# Patient Record
Sex: FEMALE | Race: OTHER | Employment: STUDENT | URBAN - METROPOLITAN AREA
[De-identification: names, ages, dates, MRNs, and addresses within clinical notes are randomized per-mention and may not be internally consistent; named-entity substitution may affect disease eponyms.]

---
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
## 2025-01-06 ENCOUNTER — HOSPITAL ENCOUNTER (OUTPATIENT)
Dept: PERINATAL CARE | Facility: HOSPITAL | Age: 20
End: 2025-01-06
Attending: STUDENT IN AN ORGANIZED HEALTH CARE EDUCATION/TRAINING PROGRAM
Payer: MEDICAID

## 2025-01-06 ENCOUNTER — ROUTINE PRENATAL (OUTPATIENT)
Dept: OBGYN CLINIC | Facility: CLINIC | Age: 20
End: 2025-01-06

## 2025-01-06 ENCOUNTER — HOSPITAL ENCOUNTER (OUTPATIENT)
Dept: PERINATAL CARE | Facility: HOSPITAL | Age: 20
Discharge: HOME | End: 2025-01-06
Attending: OBSTETRICS & GYNECOLOGY
Payer: MEDICAID

## 2025-01-06 VITALS
DIASTOLIC BLOOD PRESSURE: 82 MMHG | HEART RATE: 89 BPM | BODY MASS INDEX: 32 KG/M2 | SYSTOLIC BLOOD PRESSURE: 117 MMHG | WEIGHT: 181 LBS

## 2025-01-06 VITALS
WEIGHT: 182 LBS | HEART RATE: 101 BPM | BODY MASS INDEX: 32 KG/M2 | DIASTOLIC BLOOD PRESSURE: 70 MMHG | SYSTOLIC BLOOD PRESSURE: 106 MMHG

## 2025-01-06 DIAGNOSIS — Z34.02: Primary | ICD-10-CM

## 2025-01-06 DIAGNOSIS — O99.212 OTHER OBESITY DUE TO EXCESS CALORIES AFFECTING PREGNANCY IN SECOND TRIMESTER (HCC): ICD-10-CM

## 2025-01-06 DIAGNOSIS — E66.09 OTHER OBESITY DUE TO EXCESS CALORIES AFFECTING PREGNANCY IN SECOND TRIMESTER (HCC): ICD-10-CM

## 2025-01-06 DIAGNOSIS — Z36.3 ENCOUNTER FOR ANTENATAL SCREENING FOR MALFORMATION USING ULTRASOUND (HCC): ICD-10-CM

## 2025-01-06 DIAGNOSIS — Z34.02: ICD-10-CM

## 2025-01-06 DIAGNOSIS — O09.892 HIGH RISK TEEN PREGNANCY IN SECOND TRIMESTER (HCC): ICD-10-CM

## 2025-01-06 DIAGNOSIS — Z36.3 ENCOUNTER FOR ANTENATAL SCREENING FOR MALFORMATION USING ULTRASOUND (HCC): Primary | ICD-10-CM

## 2025-01-06 PROCEDURE — 76811 OB US DETAILED SNGL FETUS: CPT | Performed by: OBSTETRICS & GYNECOLOGY

## 2025-01-06 NOTE — PROGRESS NOTES
Haritha Cesar is a 19 year old  at 23w5d here for robv.  Had nl lv2 us today. Plan for follow up growth/bpp at 32wk and NST at 36wks.denies uc, lof or vb. +FM. Just got back from [place].  Had some n/v on and off. Feeling a bit better. Will do gtt/cbc/fe next week. Rtc in 4wk

## 2025-01-06 NOTE — PROGRESS NOTES
Reason for Consult:   Dear Dr. Mendoza,    Thank you for requesting ultrasound evaluation and maternal fetal medicine consultation on Haritha Cesar.  As you are aware she is a 19 year old female with a Orozco pregnancy at 23w5d.  A maternal-fetal medicine consultation was requested secondary to  teen pregnancy.  Her prenatal records and labs were reviewed.    Review of History:     OB History:    OB History    Para Term  AB Living   1 0 0 0 0 0   SAB IAB Ectopic Multiple Live Births   0 0 0 0 0      # Outcome Date GA Lbr Duglas/2nd Weight Sex Type Anes PTL Lv   1 Current                      Allergies:  Allergies[1]   Current Meds:  Current Outpatient Medications   Medication Sig Dispense Refill    cholecalciferol 50 MCG ( UT) Oral Tab Take 1 tablet (2,000 Units total) by mouth daily.      metoclopramide (REGLAN) 10 MG Oral Tab Take 1 tablet (10 mg total) by mouth every 6 (six) hours as needed. 20 tablet 0    CHLORPROMAZINE 50 MG Oral Tab TAKE 1/2 TABLET(25 MG) BY MOUTH THREE TIMES DAILY 30 tablet 0    ascorbic acid 250 MG Oral Tab Take 1 tablet (250 mg total) by mouth every other day. 90 tablet 2    Ferrous Sulfate 325 (65 Fe) MG Oral Tab Take 1 tablet (325 mg total) by mouth every other day. 90 tablet 3    calcium carbonate (TUMS) 500 MG Oral Chew Tab Chew 1 tablet (500 mg total) by mouth daily. (Patient not taking: Reported on 2024) 90 tablet 0    famotidine 20 MG Oral Tab Take 1 tablet (20 mg total) by mouth 2 (two) times daily. 60 tablet 5    Scopolamine 1.5mg TD patch 1mg/3days Place 1 patch onto the skin every third day. (Patient not taking: Reported on 2024) 10 patch 0    diphenhydrAMINE HCl 50 MG Oral Tab Take 1 tablet (50 mg total) by mouth every 4 to 6 hours. 90 tablet 1    promethazine 25 MG Rectal Suppos Place 1 suppository (25 mg total) rectally every 6 (six) hours as needed for Nausea. 30 suppository 5    Aspirin 81 MG Oral Cap Take 2 capsules by mouth daily.  (Patient not taking: Reported on 11/25/2024) 180 capsule 2    Ferrous Sulfate 325 (65 Fe) MG Oral Tab Take 1 tablet (325 mg total) by mouth daily with breakfast. 90 tablet 2    Prenatal MV-Min-Fe Fum-FA-DHA (PRENATAL/FOLIC ACID+DHA) 27-0.8-200 MG Oral Cap Take 1 capsule by mouth daily. (Patient not taking: Reported on 11/25/2024) 90 capsule 2    ondansetron 4 MG Oral Tablet Dispersible Take 1 tablet (4 mg total) by mouth every 4 (four) hours as needed for Nausea. (Patient not taking: Reported on 11/25/2024) 15 tablet 0    ondansetron 4 MG Oral Tablet Dispersible Take 1 tablet (4 mg total) by mouth every 8 (eight) hours as needed for Nausea. (Patient not taking: Reported on 11/25/2024) 30 tablet 0        HISTORY:  Past Medical History:    Anxiety    Decorative tattoo      Past Surgical History:   Procedure Laterality Date    Tonsillectomy        Family History   Problem Relation Age of Onset    Diabetes Mother     Diabetes Maternal Grandmother       Social History     Socioeconomic History    Marital status: Single   Tobacco Use    Smoking status: Never     Passive exposure: Never    Smokeless tobacco: Never   Vaping Use    Vaping status: Never Used   Substance and Sexual Activity    Alcohol use: Never    Drug use: Not Currently     Types: Cannabis    Sexual activity: Yes     Partners: Male     Social Drivers of Health     Financial Resource Strain: High Risk (9/13/2024)    Financial Resource Strain     Difficulty of Paying Living Expenses: Somewhat hard     Med Affordability: Yes   Food Insecurity: No Food Insecurity (9/13/2024)    Food Insecurity     Food Insecurity: Never true   Transportation Needs: No Transportation Needs (9/13/2024)    Transportation Needs     Lack of Transportation: No   Stress: No Stress Concern Present (9/13/2024)    Stress     Feeling of Stress : No   Housing Stability: Low Risk  (9/13/2024)    Housing Stability     Housing Instability: No        NARRATIVE:   /70   Pulse 101   Wt  182 lb (82.6 kg)   LMP 2024 (Approximate)   BMI 32.24 kg/m²            Alert and Oriented.  No acute distress          Abdomen:  soft, nontender, no contractions noted.           extremities:  nontender, no edema        DISCUSSION  During her visit we discussed and reviewed the following issues:    We discussed the morbidity and mortality associated with prematurity at various gestational ages.  The signs and symptoms of  labor and preeclampsia were discussed.        Pregnant adolescents are at particular risk for nutritional deficiencies. Adolescents have increased nutritional needs related to normal pubertal changes (eg, increased height and changes in body composition). At baseline, they may have poor diet quality, with insufficient intake of micronutrients (eg, iron, folate, zinc, calcium).  Adolescents appear to be at increased risk for adverse pregnancy outcomes, such as low-birth-weight babies and infant deaths.  Teenage mothers require instrumental deliveries approximately twice as often as women aged 20 to 24 years.    ----------    OBESITY:  Obesity during pregnancy is associated with numerous maternal and  risks.  It is not clear whether obesity is a direct cause of adverse pregnancy outcome or whether the association between obesity and adverse pregnancy outcome is due to factors such as diabetes mellitus.   Data suggest that obese women should be encouraged to undertake a weight reduction program (diet, exercise, behavior modification, and possibly bariatric surgery in some cases) prior to attempting to conceive.            Subfertility in obese women is most commonly related to ovulatory dysfunction, and, in some obese women, the ovulatory dysfunction is related to polycystic ovary syndrome (PCOS). It is also important to note that even among ovulatory women, increasing obesity is associated with decreasing spontaneous pregnancy rates.  The increased risk of miscarriage in obese  women may be because such women often have PCOS or isolated insulin resistance.                 Due to its strong association with obesity in the general population, type 2 diabetes mellitus is one of the two most common medical complications of the obese . The increased risk of type 2 diabetes is primarily related to an exaggerated increase in insulin resistance in the obese state. It is reasonable to screen obese gravidas for undiagnosed pregestational diabetes in the first trimester.   Glucose intolerance associated with gestational diabetes generally resolves postpartum; however, obese women with a history of gestational diabetes have a two-fold increased prevalence of subsequent type 2 diabetes.           An association between obesity and hypertensive disorders during pregnancy has been consistently reported.  In particular, maternal weight and BMI are independent risk factors for preeclampsia.             Studies have found that the increased risk of  birth in obese gravidas is primarily associated with obesity-related medical and  complications, rather than an intrinsic predisposition to spontaneous  birth. Prevention of  birth in these patients, therefore, should be directed toward prevention or management of medical and obstetrical complications.               Both prepregnancy obesity and excessive maternal weight gain before or during pregnancy contribute to an increased probability of  delivery.  It has also been hypothesized that obesity may lead to dystocia due to increased soft tissue deposition in the maternal pelvis.    delivery in the obese  is associated with numerous perioperative concerns, including emergency delivery, prolonged incision to delivery interval, blood loss >1000 mL, longer operative times, wound infection, thromboembolism, and endometritis.            Maternal obesity appears to be associated with a small increase in the  absolute rate of some congenital anomalies, and the risk may increase with increasing maternal weight.  The risk of neural tube defects increased significantly with maternal weight.    The analysis found that overweight and obese pregnant women experienced significantly more stillbirths than normal weight women.      Increase  testing and Level 2 Ultrasound is recommended.         OB ULTRASOUND REPORT   See imaging tab for complete ultrasound report or in PACS    Single IUP in breech presentation.    Placenta is posterior, fundal.   A 3 vessel cord is noted.  Cardiac activity is present at 143 bpm   g ( 1 lb 6 oz);   MVP is 4.3 cm .       Fetal Anatomy:  Visualized with normal appearance: head, face, spine, neck, skin, chest, abdominal wall, gastrointestinal tract, kidneys, bladder, extremities.   Brain: Visualized and normal appearances: brain parenchyma, cerebral ventricles, choroid plexus, Cisterna Magna, midline falx, cerebellum, cerebellar lobes, posterior fossa, vermis, cavum septi pellucidi.  Face: eyes normal, profile normal, nose normal, lip normal, palate normal.  Heart: visualized and normal appearance: 3 vessel view, four-chamber, left outflow tract, right outflow tract, arches.      Genetic Sonogram:  Nuchal fold normal.  Pyelectasis absent.  No hyperechogenic bowel.  Echogenic intracardiac foci absent. Nasal bone present. Choroid plexus cyst absent.      Summary of Ultrasound findings:  This is a Orozco pregnancy    The fetal measurements are consistent with established EDC. No gross ultrasound evidence of structural abnormalities are seen today. No minor markers for aneuploidy are seen. The patient understands that ultrasound cannot rule out all structural and chromosomal abnormalities.       IMPRESSION:   1. IUP @  23w5d  2. Scan consistent with dates  3. No fetal structural abnormalities seen  4. Teen pregnancy  5.  Obesity BMI 33    RECOMMENDATIONS:     Early 1 hr gtt  11-20 lb  weight gain for pregnancy  Growth US & BPP at 32 weeks  Weekly NSTs at 36 weeks    Thank you for allowing me to participate in the care of your patient.  Please do not hesitate to call with any questions or concerns.     Total time spent   45  minutes this calendar day which includes preparing to see the patient including chart review, obtaining and/or reviewing additional medical history, performing a physical exam and evaluation, documenting clinical information in the electronic medical record, independently interpreting results, counseling the patient, communicating results to the patient/family/caregiver and coordinating care.    Nando Boston D.O.  Maternal Fetal Medicine     Note to patient and family:  The 21st Century Cures Act makes medical notes available to patients in the interest of transparency.  However, please be advised that this is a medical document.  It is intended as a peer to peer communication.  It is written in medical language and may contain abbreviations or verbiage that are technical and unfamiliar.  It may appear blunt or direct.  Medical documents are intended to carry relevant information, facts as evident, and the clinical opinion of the practitioner.         [1] No Known Allergies

## 2025-01-16 ENCOUNTER — TELEPHONE (OUTPATIENT)
Dept: OBGYN CLINIC | Facility: CLINIC | Age: 20
End: 2025-01-16

## 2025-02-01 ENCOUNTER — LAB ENCOUNTER (OUTPATIENT)
Dept: LAB | Age: 20
End: 2025-02-01
Attending: STUDENT IN AN ORGANIZED HEALTH CARE EDUCATION/TRAINING PROGRAM
Payer: MEDICAID

## 2025-02-01 DIAGNOSIS — Z34.02: ICD-10-CM

## 2025-02-01 LAB
BASOPHILS # BLD AUTO: 0.05 X10(3) UL (ref 0–0.2)
BASOPHILS NFR BLD AUTO: 0.4 %
DEPRECATED HBV CORE AB SER IA-ACNC: 6 NG/ML
DEPRECATED RDW RBC AUTO: 40.1 FL (ref 35.1–46.3)
EOSINOPHIL # BLD AUTO: 0.7 X10(3) UL (ref 0–0.7)
EOSINOPHIL NFR BLD AUTO: 6.1 %
ERYTHROCYTE [DISTWIDTH] IN BLOOD BY AUTOMATED COUNT: 12.2 % (ref 11–15)
GLUCOSE 1H P GLC SERPL-MCNC: 108 MG/DL
HCT VFR BLD AUTO: 33.7 %
HGB BLD-MCNC: 11.2 G/DL
IMM GRANULOCYTES # BLD AUTO: 0.03 X10(3) UL (ref 0–1)
IMM GRANULOCYTES NFR BLD: 0.3 %
LYMPHOCYTES # BLD AUTO: 2.32 X10(3) UL (ref 1.5–5)
LYMPHOCYTES NFR BLD AUTO: 20.2 %
MCH RBC QN AUTO: 30.1 PG (ref 26–34)
MCHC RBC AUTO-ENTMCNC: 33.2 G/DL (ref 31–37)
MCV RBC AUTO: 90.6 FL
MONOCYTES # BLD AUTO: 0.42 X10(3) UL (ref 0.1–1)
MONOCYTES NFR BLD AUTO: 3.7 %
NEUTROPHILS # BLD AUTO: 7.98 X10 (3) UL (ref 1.5–7.7)
NEUTROPHILS # BLD AUTO: 7.98 X10(3) UL (ref 1.5–7.7)
NEUTROPHILS NFR BLD AUTO: 69.3 %
PLATELET # BLD AUTO: 398 10(3)UL (ref 150–450)
RBC # BLD AUTO: 3.72 X10(6)UL
WBC # BLD AUTO: 11.5 X10(3) UL (ref 4–11)

## 2025-02-01 PROCEDURE — 36415 COLL VENOUS BLD VENIPUNCTURE: CPT

## 2025-02-01 PROCEDURE — 85025 COMPLETE CBC W/AUTO DIFF WBC: CPT

## 2025-02-01 PROCEDURE — 82728 ASSAY OF FERRITIN: CPT

## 2025-02-01 PROCEDURE — 82950 GLUCOSE TEST: CPT

## 2025-02-03 ENCOUNTER — ROUTINE PRENATAL (OUTPATIENT)
Dept: OBGYN CLINIC | Facility: CLINIC | Age: 20
End: 2025-02-03

## 2025-02-03 VITALS
BODY MASS INDEX: 33 KG/M2 | DIASTOLIC BLOOD PRESSURE: 76 MMHG | SYSTOLIC BLOOD PRESSURE: 121 MMHG | HEART RATE: 89 BPM | WEIGHT: 188 LBS

## 2025-02-03 DIAGNOSIS — Z34.02: Primary | ICD-10-CM

## 2025-02-03 RX ORDER — ONDANSETRON 4 MG/1
4 TABLET, FILM COATED ORAL EVERY 8 HOURS PRN
Qty: 30 TABLET | Refills: 0 | Status: SHIPPED | OUTPATIENT
Start: 2025-02-03

## 2025-02-03 NOTE — PROGRESS NOTES
Haritha Cesar is a 19 year old  at 27w5d here for robv. Still having some nausea. Desires zofran. Not taking in much fluids. Denies uc, lof or vb. +FM. Tdap today. Will make follow up appt with MFM. Rtc in 2wk.

## 2025-02-17 ENCOUNTER — ROUTINE PRENATAL (OUTPATIENT)
Dept: OBGYN CLINIC | Facility: CLINIC | Age: 20
End: 2025-02-17

## 2025-02-17 VITALS
SYSTOLIC BLOOD PRESSURE: 116 MMHG | BODY MASS INDEX: 34 KG/M2 | HEART RATE: 83 BPM | WEIGHT: 192 LBS | DIASTOLIC BLOOD PRESSURE: 72 MMHG

## 2025-02-17 DIAGNOSIS — Z34.03: Primary | ICD-10-CM

## 2025-02-17 NOTE — PROGRESS NOTES
Haritha Cesar is a 19 year old  at 29w5d here for robv. Still having some n/v. Denies uc, lof or vb. +FM. Has growth scan with MFM in 3wk. Rtc in 2wk.

## 2025-03-03 ENCOUNTER — LAB ENCOUNTER (OUTPATIENT)
Dept: LAB | Age: 20
End: 2025-03-03
Attending: STUDENT IN AN ORGANIZED HEALTH CARE EDUCATION/TRAINING PROGRAM
Payer: MEDICAID

## 2025-03-03 ENCOUNTER — ROUTINE PRENATAL (OUTPATIENT)
Dept: OBGYN CLINIC | Facility: CLINIC | Age: 20
End: 2025-03-03

## 2025-03-03 VITALS
HEART RATE: 95 BPM | DIASTOLIC BLOOD PRESSURE: 74 MMHG | BODY MASS INDEX: 35 KG/M2 | WEIGHT: 198 LBS | SYSTOLIC BLOOD PRESSURE: 111 MMHG

## 2025-03-03 DIAGNOSIS — Z34.02: ICD-10-CM

## 2025-03-03 DIAGNOSIS — Z34.03: Primary | ICD-10-CM

## 2025-03-03 LAB — T PALLIDUM AB SER QL IA: NONREACTIVE

## 2025-03-03 PROCEDURE — 87389 HIV-1 AG W/HIV-1&-2 AB AG IA: CPT

## 2025-03-03 PROCEDURE — 36415 COLL VENOUS BLD VENIPUNCTURE: CPT

## 2025-03-03 PROCEDURE — 86780 TREPONEMA PALLIDUM: CPT

## 2025-03-03 NOTE — PROGRESS NOTES
Haritha Cesar is a 19 year old  at 31w5d here for robv. Doing well. Denies uc, lof or vb. +FM. Has growth scan next week. Considering Paragard IUD pp. Rtc in 2wks.

## 2025-03-10 ENCOUNTER — HOSPITAL ENCOUNTER (OUTPATIENT)
Dept: PERINATAL CARE | Facility: HOSPITAL | Age: 20
Discharge: HOME | End: 2025-03-10
Attending: OBSTETRICS & GYNECOLOGY
Payer: MEDICAID

## 2025-03-10 ENCOUNTER — HOSPITAL ENCOUNTER (OUTPATIENT)
Dept: PERINATAL CARE | Facility: HOSPITAL | Age: 20
End: 2025-03-10
Attending: OBSTETRICS & GYNECOLOGY
Payer: MEDICAID

## 2025-03-10 VITALS
WEIGHT: 198 LBS | BODY MASS INDEX: 35 KG/M2 | HEART RATE: 111 BPM | DIASTOLIC BLOOD PRESSURE: 72 MMHG | SYSTOLIC BLOOD PRESSURE: 102 MMHG

## 2025-03-10 DIAGNOSIS — O99.213 OTHER OBESITY DUE TO EXCESS CALORIES AFFECTING PREGNANCY IN THIRD TRIMESTER (HCC): ICD-10-CM

## 2025-03-10 DIAGNOSIS — O09.893 HIGH RISK TEEN PREGNANCY IN THIRD TRIMESTER (HCC): Primary | ICD-10-CM

## 2025-03-10 DIAGNOSIS — O26.841 UTERINE SIZE-DATE DISCREPANCY IN FIRST TRIMESTER (HCC): ICD-10-CM

## 2025-03-10 DIAGNOSIS — E66.09 OTHER OBESITY DUE TO EXCESS CALORIES AFFECTING PREGNANCY IN THIRD TRIMESTER (HCC): ICD-10-CM

## 2025-03-10 PROCEDURE — 76816 OB US FOLLOW-UP PER FETUS: CPT | Performed by: OBSTETRICS & GYNECOLOGY

## 2025-03-10 NOTE — PROGRESS NOTES
Pt for Growth US  Denies pregnancy complaints  States active fetus   Information given to pt for [facility]

## 2025-03-10 NOTE — PROGRESS NOTES
Reason for Consult:   Dear Dr. Mendoza,    Thank you for requesting ultrasound evaluation and maternal fetal medicine consultation on Haritha Cesar.  As you are aware she is a 19 year old female with a Orozco pregnancy with Estimated Date of Delivery: 25 .   A maternal-fetal medicine f/u is today. .  Her prenatal records and labs were reviewed.    Planning on copper IUD  Review of History:     OB History:    OB History    Para Term  AB Living   1 0 0 0 0 0   SAB IAB Ectopic Multiple Live Births   0 0 0 0 0      # Outcome Date GA Lbr Duglas/2nd Weight Sex Type Anes PTL Lv   1 Current                      Allergies:  Allergies[1]   Current Meds:  Current Outpatient Medications   Medication Sig Dispense Refill    ondansetron (ZOFRAN) 4 mg tablet Take 1 tablet (4 mg total) by mouth every 8 (eight) hours as needed for Nausea. (Patient not taking: Reported on 2025) 30 tablet 0    cholecalciferol 50 MCG (2000 UT) Oral Tab Take 1 tablet (2,000 Units total) by mouth daily. (Patient not taking: Reported on 2025)      metoclopramide (REGLAN) 10 MG Oral Tab Take 1 tablet (10 mg total) by mouth every 6 (six) hours as needed. (Patient not taking: Reported on 2025) 20 tablet 0    CHLORPROMAZINE 50 MG Oral Tab TAKE 1/2 TABLET(25 MG) BY MOUTH THREE TIMES DAILY (Patient not taking: Reported on 2025) 30 tablet 0    ascorbic acid 250 MG Oral Tab Take 1 tablet (250 mg total) by mouth every other day. (Patient not taking: Reported on 2025) 90 tablet 2    calcium carbonate (TUMS) 500 MG Oral Chew Tab Chew 1 tablet (500 mg total) by mouth daily. (Patient not taking: Reported on 2025) 90 tablet 0    famotidine 20 MG Oral Tab Take 1 tablet (20 mg total) by mouth 2 (two) times daily. (Patient not taking: Reported on 2025) 60 tablet 5    diphenhydrAMINE HCl 50 MG Oral Tab Take 1 tablet (50 mg total) by mouth every 4 to 6 hours. (Patient not taking: Reported on 2025) 90 tablet 1     Aspirin 81 MG Oral Cap Take 2 capsules by mouth daily. (Patient not taking: Reported on 2/17/2025) 180 capsule 2    Ferrous Sulfate 325 (65 Fe) MG Oral Tab Take 1 tablet (325 mg total) by mouth daily with breakfast. 90 tablet 2    Prenatal MV-Min-Fe Fum-FA-DHA (PRENATAL/FOLIC ACID+DHA) 27-0.8-200 MG Oral Cap Take 1 capsule by mouth daily. 90 capsule 2    ondansetron 4 MG Oral Tablet Dispersible Take 1 tablet (4 mg total) by mouth every 4 (four) hours as needed for Nausea. (Patient not taking: Reported on 2/17/2025) 15 tablet 0        HISTORY:  Past Medical History:    Anxiety    Decorative tattoo      Past Surgical History:   Procedure Laterality Date    Tonsillectomy        Family History   Problem Relation Age of Onset    Diabetes Mother     Diabetes Maternal Grandmother       Social History     Socioeconomic History    Marital status: Single   Tobacco Use    Smoking status: Never     Passive exposure: Never    Smokeless tobacco: Never   Vaping Use    Vaping status: Never Used   Substance and Sexual Activity    Alcohol use: Never    Drug use: Not Currently     Types: Cannabis    Sexual activity: Yes     Partners: Male     Social Drivers of Health     Food Insecurity: No Food Insecurity (9/13/2024)    Food Insecurity     Food Insecurity: Never true   Transportation Needs: No Transportation Needs (9/13/2024)    Transportation Needs     Lack of Transportation: No   Stress: No Stress Concern Present (9/13/2024)    Stress     Feeling of Stress : No   Housing Stability: Low Risk  (9/13/2024)    Housing Stability     Housing Instability: No        NARRATIVE:   /72   Pulse 111   Wt 198 lb (89.8 kg)   LMP 07/04/2024 (Approximate)   BMI 35.07 kg/m²            Alert and Oriented.  No acute distress          Abdomen:  soft, nontender, no contractions noted.           DISCUSSION  During her visit we discussed and reviewed the following issues:    We discussed the morbidity and mortality associated with prematurity  at various gestational ages.  The signs and symptoms of  labor and preeclampsia were discussed.        Pregnant adolescents are at particular risk for nutritional deficiencies. Adolescents have increased nutritional needs related to normal pubertal changes (eg, increased height and changes in body composition). At baseline, they may have poor diet quality, with insufficient intake of micronutrients (eg, iron, folate, zinc, calcium).  Adolescents appear to be at increased risk for adverse pregnancy outcomes, such as low-birth-weight babies and infant deaths.  Teenage mothers require instrumental deliveries approximately twice as often as women aged 20 to 24 years.    ----------    OBESITY: Please see previous MFM detailed discussion.       GLUCOSE 1HR OB   Date Value Ref Range Status   2025 108 See comment mg/dL Final     Comment:     If the plasma glucose level measured after 1 hour is >=130, 135 or 140 mg/dl proceed to \"Glucose Tolerance, 100 gm (0 hr, 1 hr, 2hr, 3hr), Gestational (ADA)\" test on a separate day, as clinically indicated.            Signs and symptoms of preeclampsia were reviewed.      OB ULTRASOUND REPORT   See imaging tab for complete ultrasound report or in PACS  Ultrasound Findings:  Single IUP in cephalic presentation.    Placenta is posterior.   A 3 vessel cord is noted.  Cardiac activity is present at 151 bpm  EFW 1996 g ( 4 lb 6 oz); 43%.    BUBBA is  15.7 cm.  MVP is 5.2 cm  BPP is 8/8.     The fetal measurements are consistent with established EDC. No gross ultrasound evidence of structural abnormalities are seen today. The patient understands that ultrasound cannot rule out all structural and chromosomal abnormalities.       IMPRESSION:   1. IUP @  32w5d   2. Scan consistent with dates  3. No fetal structural abnormalities seen  4. Teen pregnancy  5.  Obesity BMI 33    RECOMMENDATIONS:     11-20 lb weight gain for pregnancy  Weekly NSTs at 36 weeks    Thank you for allowing me  to participate in the care of your patient.  Please do not hesitate to call with any questions or concerns.     Total time spent   20  minutes this calendar day which includes preparing to see the patient including chart review, obtaining and/or reviewing additional medical history, performing a physical exam and evaluation, documenting clinical information in the electronic medical record, independently interpreting results, counseling the patient, communicating results to the patient/family/caregiver and coordinating care.    Tootie Mccord MD   Maternal Fetal Medicine     Note to patient and family:  The 21st Century Cures Act makes medical notes available to patients in the interest of transparency.  However, please be advised that this is a medical document.  It is intended as a peer to peer communication.  It is written in medical language and may contain abbreviations or verbiage that are technical and unfamiliar.  It may appear blunt or direct.  Medical documents are intended to carry relevant information, facts as evident, and the clinical opinion of the practitioner.         [1] No Known Allergies

## 2025-03-17 ENCOUNTER — ROUTINE PRENATAL (OUTPATIENT)
Dept: OBGYN CLINIC | Facility: CLINIC | Age: 20
End: 2025-03-17

## 2025-03-17 VITALS
SYSTOLIC BLOOD PRESSURE: 121 MMHG | BODY MASS INDEX: 36 KG/M2 | HEART RATE: 99 BPM | WEIGHT: 201.81 LBS | DIASTOLIC BLOOD PRESSURE: 83 MMHG

## 2025-03-17 DIAGNOSIS — Z34.03: Primary | ICD-10-CM

## 2025-03-17 NOTE — PROGRESS NOTES
Haritha Cesar is a 19 year old  at 33w5d here for robv. Denies uc, lof or vb. +FM. Had normal growth scan. GBS next visit. Start NST at 36wks. Rtc in 2wk.

## 2025-03-31 ENCOUNTER — ROUTINE PRENATAL (OUTPATIENT)
Dept: OBGYN CLINIC | Facility: CLINIC | Age: 20
End: 2025-03-31

## 2025-03-31 VITALS
BODY MASS INDEX: 37 KG/M2 | DIASTOLIC BLOOD PRESSURE: 73 MMHG | SYSTOLIC BLOOD PRESSURE: 118 MMHG | HEART RATE: 107 BPM | WEIGHT: 208 LBS

## 2025-03-31 DIAGNOSIS — Z34.03: Primary | ICD-10-CM

## 2025-03-31 NOTE — PROGRESS NOTES
Haritha Cesar is a 19 year old  at 35w5d here for robv. Denies uc, lof or vb. +FM. Gbs done today. Nst to start next week. ptl/fkc precx reviewed. Rtc in one week.

## 2025-04-07 ENCOUNTER — ROUTINE PRENATAL (OUTPATIENT)
Dept: OBGYN CLINIC | Facility: CLINIC | Age: 20
End: 2025-04-07

## 2025-04-07 VITALS
DIASTOLIC BLOOD PRESSURE: 83 MMHG | SYSTOLIC BLOOD PRESSURE: 135 MMHG | WEIGHT: 210 LBS | HEART RATE: 107 BPM | BODY MASS INDEX: 37 KG/M2

## 2025-04-07 DIAGNOSIS — Z34.01: ICD-10-CM

## 2025-04-07 LAB — GROUP B STREP BY PCR FOR PCR OVT: POSITIVE

## 2025-04-07 RX ORDER — MULTIVIT-MIN/IRON/FOLIC ACID/K 18-600-40
1 CAPSULE ORAL DAILY
Qty: 90 CAPSULE | Refills: 2 | Status: SHIPPED | OUTPATIENT
Start: 2025-04-07

## 2025-04-07 NOTE — PROGRESS NOTES
Haritha Cesar is a 19 year old  at 36w5d here for robv. Doing well. Denies uc, lof or vb. +FM. NST R. No ctx on toco. Reviewed GBS+. Reviewed delivery timing, will schedule IOL at 39wk. Cont weekly NST. Labor/fkc precx reviewed.

## 2025-04-14 ENCOUNTER — TELEPHONE (OUTPATIENT)
Dept: OBGYN CLINIC | Facility: CLINIC | Age: 20
End: 2025-04-14

## 2025-04-14 ENCOUNTER — ROUTINE PRENATAL (OUTPATIENT)
Dept: OBGYN CLINIC | Facility: CLINIC | Age: 20
End: 2025-04-14

## 2025-04-14 VITALS
WEIGHT: 214 LBS | SYSTOLIC BLOOD PRESSURE: 117 MMHG | DIASTOLIC BLOOD PRESSURE: 80 MMHG | BODY MASS INDEX: 38 KG/M2 | HEART RATE: 95 BPM

## 2025-04-14 DIAGNOSIS — Z34.03: Primary | ICD-10-CM

## 2025-04-14 RX ORDER — PRENATAL VIT/IRON FUM/FOLIC AC 27MG-0.8MG
1 TABLET ORAL DAILY
COMMUNITY
Start: 2025-04-07

## 2025-04-14 NOTE — TELEPHONE ENCOUNTER
Please schedule the following surgery:    Procedure: induction of labor, cytotec PM  Assist: (Y/N or none) n  Date: 4/23  Dx:teen pregnancy  Pre-op appt: (Y/N or n/a) n  Admission type: (IN/OUT/OBVS) in  Department of discharge(SDS/Floor): fbc  Expected length of stay: 2d  Procedure length time (please enter amount you are requesting): n/a  Recovery time (patients always ask): 6wk  Medical Clearance:  yes if >49y/o or major medical problems or BMI >40   Special Requests: n    Surgical prophylaxis: n       ALL Medicaid/including BCBS community: Tubal/ Hyst form MUST be signed (30 days):     Message to nurses: n

## 2025-04-14 NOTE — PATIENT INSTRUCTIONS
Getting ready for baby!     [ ] Classes:   [email or web address]    [ ] Pre-registration for hospital:  [email or web address]    [ ] Pediatrician or Family medicine practitioner:  [email or web address]    Labor & delivery staff will notify your practitioner (if on staff at [facility]) when delivery occurs  or   [facility] pediatric hospitalist will care for your infant(s) after delivery during the hospital stay, but you   must arrange for a pediatrician follow up visit before discharge from the hospital.     [ ] Breast pump:   Options:  You may ask your insurance to send us an order form  We send order form to [employer]. They will contact your insurance company    You should NOT bring your breast pump to the hospital (to avoid loss or damaged parts)  The hospital will have a pump there already for your use while at the hospital.   If you do not have a pump at home, you can rent one from the hospital while waiting to get one    [ ] Carseat:  MUST be installed properly for your infant(s) to be discharged safely from hospital    [ ] Pack a bag for yourself (can keep in your car):   Anticipated length of stay:      Vaginal delivery (uncomplicated): 24-48 hours (1-2 days)      delivery (uncomplicated): 48-96 hours (2-4 days)     What to bring:      Soft clothing, pajamas, robe (if desired), slippers, shampoo, conditioner, toothpaste, toothbrush, floss,      facial cleanser, glasses and contacts, lip balm, cell phone & , a few cute baby outfits.     A small fan that can attach to/clip on to bed side-rail can be helpful     A few tight sports bras (if not planning to breast feed)    Hospital will provide (if needed)     Gown, socks with treads, shampoo, toothpaste, toothbrush, soap, towels, bedding, diapers,      ice pack pads & pads for mom, mesh underwear, infant hat, infant gown, swaddling blanket, formula,      breast pump &  supplies, supplies for claire-care, hemorrhoids, constipation

## 2025-04-14 NOTE — PROGRESS NOTES
Haritha Cesar is a 19 year old  at 37w5d here for robv. NST R, one ctx on toco. She denies uc, lof or vb. +FM. Some annabelle nguyen. Desires IOL at 39wks. Request sent. Cont weekly nst. GBS+ Labor/fkc precx reviewed.

## 2025-04-21 ENCOUNTER — ROUTINE PRENATAL (OUTPATIENT)
Dept: OBGYN CLINIC | Facility: CLINIC | Age: 20
End: 2025-04-21

## 2025-04-21 ENCOUNTER — TELEPHONE (OUTPATIENT)
Dept: OBGYN UNIT | Facility: HOSPITAL | Age: 20
End: 2025-04-21

## 2025-04-21 VITALS
SYSTOLIC BLOOD PRESSURE: 118 MMHG | DIASTOLIC BLOOD PRESSURE: 84 MMHG | HEART RATE: 97 BPM | BODY MASS INDEX: 38 KG/M2 | WEIGHT: 215 LBS

## 2025-04-21 DIAGNOSIS — Z34.03: Primary | ICD-10-CM

## 2025-04-21 NOTE — PROGRESS NOTES
Haritha Cesar is a 19 year old  at 38w5d here for robv. Doing well. Denies uc, lof or vb. +FM. Had IOL for Wed night. Labor/fkc precx reviewed.

## 2025-04-24 ENCOUNTER — APPOINTMENT (OUTPATIENT)
Dept: OBGYN CLINIC | Facility: HOSPITAL | Age: 20
End: 2025-04-24
Payer: MEDICAID

## 2025-04-24 ENCOUNTER — HOSPITAL ENCOUNTER (INPATIENT)
Facility: HOSPITAL | Age: 20
LOS: 4 days | Discharge: HOME | End: 2025-04-28
Attending: STUDENT IN AN ORGANIZED HEALTH CARE EDUCATION/TRAINING PROGRAM | Admitting: STUDENT IN AN ORGANIZED HEALTH CARE EDUCATION/TRAINING PROGRAM
Payer: MEDICAID

## 2025-04-24 PROBLEM — Z34.90 PREGNANT (HCC): Status: ACTIVE | Noted: 2025-04-24

## 2025-04-24 PROBLEM — Z34.90 PREGNANCY (HCC): Status: ACTIVE | Noted: 2025-04-24

## 2025-04-24 LAB
ANTIBODY SCREEN: NEGATIVE
BASOPHILS # BLD AUTO: 0.03 X10(3) UL (ref 0–0.2)
BASOPHILS NFR BLD AUTO: 0.3 %
DEPRECATED RDW RBC AUTO: 43.3 FL (ref 35.1–46.3)
EOSINOPHIL # BLD AUTO: 0.49 X10(3) UL (ref 0–0.7)
EOSINOPHIL NFR BLD AUTO: 4.8 %
ERYTHROCYTE [DISTWIDTH] IN BLOOD BY AUTOMATED COUNT: 13.9 % (ref 11–15)
HCT VFR BLD AUTO: 32.3 % (ref 35–48)
HGB BLD-MCNC: 10.7 G/DL (ref 12–16)
IMM GRANULOCYTES # BLD AUTO: 0.03 X10(3) UL (ref 0–1)
IMM GRANULOCYTES NFR BLD: 0.3 %
LYMPHOCYTES # BLD AUTO: 2.74 X10(3) UL (ref 1.5–5)
LYMPHOCYTES NFR BLD AUTO: 26.8 %
MCH RBC QN AUTO: 28.2 PG (ref 26–34)
MCHC RBC AUTO-ENTMCNC: 33.1 G/DL (ref 31–37)
MCV RBC AUTO: 85.2 FL (ref 80–100)
MONOCYTES # BLD AUTO: 0.55 X10(3) UL (ref 0.1–1)
MONOCYTES NFR BLD AUTO: 5.4 %
NEUTROPHILS # BLD AUTO: 6.4 X10 (3) UL (ref 1.5–7.7)
NEUTROPHILS # BLD AUTO: 6.4 X10(3) UL (ref 1.5–7.7)
NEUTROPHILS NFR BLD AUTO: 62.4 %
PLATELET # BLD AUTO: 385 10(3)UL (ref 150–450)
RBC # BLD AUTO: 3.79 X10(6)UL (ref 3.8–5.3)
RH BLOOD TYPE: POSITIVE
RH BLOOD TYPE: POSITIVE
T PALLIDUM AB SER QL IA: NONREACTIVE
WBC # BLD AUTO: 10.2 X10(3) UL (ref 4–11)

## 2025-04-24 PROCEDURE — 3E0DXGC INTRODUCTION OF OTHER THERAPEUTIC SUBSTANCE INTO MOUTH AND PHARYNX, EXTERNAL APPROACH: ICD-10-PCS | Performed by: OBSTETRICS & GYNECOLOGY

## 2025-04-24 RX ORDER — DEXTROSE, SODIUM CHLORIDE, SODIUM LACTATE, POTASSIUM CHLORIDE, AND CALCIUM CHLORIDE 5; .6; .31; .03; .02 G/100ML; G/100ML; G/100ML; G/100ML; G/100ML
INJECTION, SOLUTION INTRAVENOUS AS NEEDED
Status: DISCONTINUED | OUTPATIENT
Start: 2025-04-24 | End: 2025-04-26 | Stop reason: HOSPADM

## 2025-04-24 RX ORDER — TERBUTALINE SULFATE 1 MG/ML
0.25 INJECTION SUBCUTANEOUS AS NEEDED
Status: COMPLETED | OUTPATIENT
Start: 2025-04-24 | End: 2025-04-25

## 2025-04-24 RX ORDER — TERBUTALINE SULFATE 1 MG/ML
0.25 INJECTION SUBCUTANEOUS AS NEEDED
Status: DISCONTINUED | OUTPATIENT
Start: 2025-04-24 | End: 2025-04-26 | Stop reason: HOSPADM

## 2025-04-24 RX ORDER — ACETAMINOPHEN 500 MG
1000 TABLET ORAL EVERY 6 HOURS PRN
Status: DISCONTINUED | OUTPATIENT
Start: 2025-04-24 | End: 2025-04-26 | Stop reason: HOSPADM

## 2025-04-24 RX ORDER — HYDROXYZINE HYDROCHLORIDE 50 MG/ML
50 INJECTION, SOLUTION INTRAMUSCULAR EVERY 6 HOURS PRN
Status: DISCONTINUED | OUTPATIENT
Start: 2025-04-24 | End: 2025-04-26 | Stop reason: HOSPADM

## 2025-04-24 RX ORDER — SODIUM CHLORIDE, SODIUM LACTATE, POTASSIUM CHLORIDE, CALCIUM CHLORIDE 600; 310; 30; 20 MG/100ML; MG/100ML; MG/100ML; MG/100ML
INJECTION, SOLUTION INTRAVENOUS CONTINUOUS
Status: DISCONTINUED | OUTPATIENT
Start: 2025-04-24 | End: 2025-04-26 | Stop reason: HOSPADM

## 2025-04-24 RX ORDER — IBUPROFEN 600 MG/1
600 TABLET, FILM COATED ORAL ONCE AS NEEDED
Status: DISCONTINUED | OUTPATIENT
Start: 2025-04-24 | End: 2025-04-26 | Stop reason: HOSPADM

## 2025-04-24 RX ORDER — NALBUPHINE HYDROCHLORIDE 10 MG/ML
10 INJECTION INTRAMUSCULAR; INTRAVENOUS; SUBCUTANEOUS EVERY 6 HOURS PRN
Status: DISCONTINUED | OUTPATIENT
Start: 2025-04-24 | End: 2025-04-26 | Stop reason: HOSPADM

## 2025-04-24 RX ORDER — METOCLOPRAMIDE HYDROCHLORIDE 5 MG/ML
10 INJECTION INTRAMUSCULAR; INTRAVENOUS EVERY 6 HOURS PRN
Status: DISCONTINUED | OUTPATIENT
Start: 2025-04-24 | End: 2025-04-26 | Stop reason: HOSPADM

## 2025-04-24 RX ORDER — LIDOCAINE HYDROCHLORIDE 10 MG/ML
30 INJECTION, SOLUTION EPIDURAL; INFILTRATION; INTRACAUDAL; PERINEURAL ONCE
Status: DISCONTINUED | OUTPATIENT
Start: 2025-04-24 | End: 2025-04-26 | Stop reason: HOSPADM

## 2025-04-24 RX ORDER — CALCIUM CARBONATE 500 MG/1
1000 TABLET, CHEWABLE ORAL EVERY 4 HOURS PRN
Status: DISCONTINUED | OUTPATIENT
Start: 2025-04-24 | End: 2025-04-26 | Stop reason: HOSPADM

## 2025-04-24 RX ORDER — ONDANSETRON 2 MG/ML
4 INJECTION INTRAMUSCULAR; INTRAVENOUS EVERY 6 HOURS PRN
Status: DISCONTINUED | OUTPATIENT
Start: 2025-04-24 | End: 2025-04-26 | Stop reason: HOSPADM

## 2025-04-24 RX ORDER — CITRIC ACID/SODIUM CITRATE 334-500MG
30 SOLUTION, ORAL ORAL AS NEEDED
Status: DISCONTINUED | OUTPATIENT
Start: 2025-04-24 | End: 2025-04-26 | Stop reason: HOSPADM

## 2025-04-24 RX ORDER — ACETAMINOPHEN 500 MG
500 TABLET ORAL EVERY 6 HOURS PRN
Status: DISCONTINUED | OUTPATIENT
Start: 2025-04-24 | End: 2025-04-26 | Stop reason: HOSPADM

## 2025-04-25 ENCOUNTER — ANESTHESIA EVENT (OUTPATIENT)
Dept: OBGYN UNIT | Facility: HOSPITAL | Age: 20
End: 2025-04-25
Payer: MEDICAID

## 2025-04-25 ENCOUNTER — ANESTHESIA (OUTPATIENT)
Dept: OBGYN UNIT | Facility: HOSPITAL | Age: 20
End: 2025-04-25
Payer: MEDICAID

## 2025-04-25 RX ORDER — DIPHENHYDRAMINE HYDROCHLORIDE 50 MG/ML
12.5 INJECTION, SOLUTION INTRAMUSCULAR; INTRAVENOUS EVERY 4 HOURS PRN
Status: ACTIVE | OUTPATIENT
Start: 2025-04-25 | End: 2025-04-26

## 2025-04-25 RX ORDER — METOCLOPRAMIDE 10 MG/1
10 TABLET ORAL ONCE
Status: DISCONTINUED | OUTPATIENT
Start: 2025-04-25 | End: 2025-04-26 | Stop reason: HOSPADM

## 2025-04-25 RX ORDER — FAMOTIDINE 20 MG/1
20 TABLET, FILM COATED ORAL ONCE
Status: COMPLETED | OUTPATIENT
Start: 2025-04-25 | End: 2025-04-25

## 2025-04-25 RX ORDER — ONDANSETRON 2 MG/ML
4 INJECTION INTRAMUSCULAR; INTRAVENOUS ONCE AS NEEDED
Status: ACTIVE | OUTPATIENT
Start: 2025-04-25 | End: 2025-04-25

## 2025-04-25 RX ORDER — SODIUM CHLORIDE, SODIUM LACTATE, POTASSIUM CHLORIDE, CALCIUM CHLORIDE 600; 310; 30; 20 MG/100ML; MG/100ML; MG/100ML; MG/100ML
INJECTION, SOLUTION INTRAVENOUS CONTINUOUS PRN
Status: DISCONTINUED | OUTPATIENT
Start: 2025-04-25 | End: 2025-04-26 | Stop reason: SURG

## 2025-04-25 RX ORDER — LIDOCAINE HYDROCHLORIDE AND EPINEPHRINE 20; 5 MG/ML; UG/ML
INJECTION, SOLUTION EPIDURAL; INFILTRATION; INTRACAUDAL; PERINEURAL AS NEEDED
Status: DISCONTINUED | OUTPATIENT
Start: 2025-04-25 | End: 2025-04-26 | Stop reason: SURG

## 2025-04-25 RX ORDER — TRANEXAMIC ACID 10 MG/ML
INJECTION, SOLUTION INTRAVENOUS AS NEEDED
Status: DISCONTINUED | OUTPATIENT
Start: 2025-04-25 | End: 2025-04-26 | Stop reason: SURG

## 2025-04-25 RX ORDER — NALOXONE HYDROCHLORIDE 0.4 MG/ML
0.08 INJECTION, SOLUTION INTRAMUSCULAR; INTRAVENOUS; SUBCUTANEOUS
Status: ACTIVE | OUTPATIENT
Start: 2025-04-25 | End: 2025-04-26

## 2025-04-25 RX ORDER — TRANEXAMIC ACID 10 MG/ML
INJECTION, SOLUTION INTRAVENOUS
Status: DISPENSED
Start: 2025-04-25 | End: 2025-04-26

## 2025-04-25 RX ORDER — MORPHINE SULFATE 1 MG/ML
INJECTION, SOLUTION EPIDURAL; INTRATHECAL; INTRAVENOUS AS NEEDED
Status: DISCONTINUED | OUTPATIENT
Start: 2025-04-25 | End: 2025-04-26 | Stop reason: SURG

## 2025-04-25 RX ORDER — MEPERIDINE HYDROCHLORIDE 25 MG/ML
INJECTION INTRAMUSCULAR; INTRAVENOUS; SUBCUTANEOUS
Status: DISPENSED
Start: 2025-04-25 | End: 2025-04-26

## 2025-04-25 RX ORDER — HYDROMORPHONE HYDROCHLORIDE 1 MG/ML
0.6 INJECTION, SOLUTION INTRAMUSCULAR; INTRAVENOUS; SUBCUTANEOUS
Refills: 0 | Status: ACTIVE | OUTPATIENT
Start: 2025-04-25 | End: 2025-04-26

## 2025-04-25 RX ORDER — BUPIVACAINE HCL/0.9 % NACL/PF 0.25 %
5 PLASTIC BAG, INJECTION (ML) EPIDURAL AS NEEDED
Status: DISCONTINUED | OUTPATIENT
Start: 2025-04-25 | End: 2025-04-26

## 2025-04-25 RX ORDER — METHYLERGONOVINE MALEATE 0.2 MG/ML
INJECTION INTRAVENOUS AS NEEDED
Status: DISCONTINUED | OUTPATIENT
Start: 2025-04-25 | End: 2025-04-26 | Stop reason: SURG

## 2025-04-25 RX ORDER — BUPIVACAINE HYDROCHLORIDE 2.5 MG/ML
20 INJECTION, SOLUTION EPIDURAL; INFILTRATION; INTRACAUDAL; PERINEURAL ONCE
Status: COMPLETED | OUTPATIENT
Start: 2025-04-25 | End: 2025-04-25

## 2025-04-25 RX ORDER — ONDANSETRON 2 MG/ML
INJECTION INTRAMUSCULAR; INTRAVENOUS AS NEEDED
Status: DISCONTINUED | OUTPATIENT
Start: 2025-04-25 | End: 2025-04-26 | Stop reason: SURG

## 2025-04-25 RX ORDER — METHYLERGONOVINE MALEATE 0.2 MG/ML
INJECTION INTRAVENOUS
Status: DISPENSED
Start: 2025-04-25 | End: 2025-04-26

## 2025-04-25 RX ORDER — HYDROMORPHONE HYDROCHLORIDE 1 MG/ML
0.4 INJECTION, SOLUTION INTRAMUSCULAR; INTRAVENOUS; SUBCUTANEOUS
Refills: 0 | Status: ACTIVE | OUTPATIENT
Start: 2025-04-25 | End: 2025-04-26

## 2025-04-25 RX ORDER — DIPHENHYDRAMINE HYDROCHLORIDE 50 MG/ML
INJECTION, SOLUTION INTRAMUSCULAR; INTRAVENOUS
Status: COMPLETED
Start: 2025-04-25 | End: 2025-04-25

## 2025-04-25 RX ORDER — FAMOTIDINE 10 MG/ML
INJECTION, SOLUTION INTRAVENOUS
Status: COMPLETED
Start: 2025-04-25 | End: 2025-04-25

## 2025-04-25 RX ORDER — ACETAMINOPHEN 325 MG/1
650 TABLET ORAL EVERY 6 HOURS PRN
Status: ACTIVE | OUTPATIENT
Start: 2025-04-25 | End: 2025-04-26

## 2025-04-25 RX ORDER — TERBUTALINE SULFATE 1 MG/ML
INJECTION SUBCUTANEOUS
Status: COMPLETED
Start: 2025-04-25 | End: 2025-04-25

## 2025-04-25 RX ORDER — DIPHENHYDRAMINE HCL 25 MG
25 CAPSULE ORAL EVERY 4 HOURS PRN
Status: ACTIVE | OUTPATIENT
Start: 2025-04-25 | End: 2025-04-26

## 2025-04-25 RX ORDER — MEPERIDINE HYDROCHLORIDE 25 MG/ML
12.5 INJECTION INTRAMUSCULAR; INTRAVENOUS; SUBCUTANEOUS ONCE
Refills: 0 | Status: COMPLETED | OUTPATIENT
Start: 2025-04-25 | End: 2025-04-25

## 2025-04-25 RX ORDER — PROCHLORPERAZINE EDISYLATE 5 MG/ML
5 INJECTION INTRAMUSCULAR; INTRAVENOUS ONCE AS NEEDED
Status: ACTIVE | OUTPATIENT
Start: 2025-04-25 | End: 2025-04-25

## 2025-04-25 RX ORDER — NALBUPHINE HYDROCHLORIDE 10 MG/ML
2.5 INJECTION INTRAMUSCULAR; INTRAVENOUS; SUBCUTANEOUS
Status: DISCONTINUED | OUTPATIENT
Start: 2025-04-25 | End: 2025-04-26

## 2025-04-25 RX ORDER — LIDOCAINE HYDROCHLORIDE AND EPINEPHRINE 15; 5 MG/ML; UG/ML
INJECTION, SOLUTION EPIDURAL AS NEEDED
Status: DISCONTINUED | OUTPATIENT
Start: 2025-04-25 | End: 2025-04-26 | Stop reason: SURG

## 2025-04-25 RX ORDER — DIPHENHYDRAMINE HYDROCHLORIDE 50 MG/ML
50 INJECTION, SOLUTION INTRAMUSCULAR; INTRAVENOUS ONCE
Status: COMPLETED | OUTPATIENT
Start: 2025-04-25 | End: 2025-04-25

## 2025-04-25 RX ORDER — HALOPERIDOL 5 MG/ML
0.5 INJECTION INTRAMUSCULAR ONCE AS NEEDED
Status: ACTIVE | OUTPATIENT
Start: 2025-04-25 | End: 2025-04-25

## 2025-04-25 RX ORDER — NALBUPHINE HYDROCHLORIDE 10 MG/ML
2.5 INJECTION INTRAMUSCULAR; INTRAVENOUS; SUBCUTANEOUS EVERY 4 HOURS PRN
Status: ACTIVE | OUTPATIENT
Start: 2025-04-25 | End: 2025-04-26

## 2025-04-25 RX ORDER — DEXAMETHASONE SODIUM PHOSPHATE 4 MG/ML
VIAL (ML) INJECTION AS NEEDED
Status: DISCONTINUED | OUTPATIENT
Start: 2025-04-25 | End: 2025-04-26 | Stop reason: SURG

## 2025-04-25 RX ORDER — SODIUM CHLORIDE, SODIUM LACTATE, POTASSIUM CHLORIDE, CALCIUM CHLORIDE 600; 310; 30; 20 MG/100ML; MG/100ML; MG/100ML; MG/100ML
INJECTION, SOLUTION INTRAVENOUS ONCE
Status: DISCONTINUED | OUTPATIENT
Start: 2025-04-25 | End: 2025-04-26

## 2025-04-25 RX ORDER — METOCLOPRAMIDE HYDROCHLORIDE 5 MG/ML
10 INJECTION INTRAMUSCULAR; INTRAVENOUS ONCE
Status: DISCONTINUED | OUTPATIENT
Start: 2025-04-25 | End: 2025-04-26 | Stop reason: HOSPADM

## 2025-04-25 RX ORDER — ACETAMINOPHEN 500 MG
1000 TABLET ORAL ONCE
Status: DISCONTINUED | OUTPATIENT
Start: 2025-04-25 | End: 2025-04-26 | Stop reason: HOSPADM

## 2025-04-25 RX ORDER — FAMOTIDINE 10 MG/ML
20 INJECTION, SOLUTION INTRAVENOUS ONCE
Status: COMPLETED | OUTPATIENT
Start: 2025-04-25 | End: 2025-04-25

## 2025-04-25 RX ORDER — HYDROCODONE BITARTRATE AND ACETAMINOPHEN 7.5; 325 MG/1; MG/1
1 TABLET ORAL EVERY 6 HOURS PRN
Refills: 0 | Status: ACTIVE | OUTPATIENT
Start: 2025-04-25 | End: 2025-04-26

## 2025-04-25 RX ORDER — LIDOCAINE HYDROCHLORIDE 10 MG/ML
INJECTION, SOLUTION EPIDURAL; INFILTRATION; INTRACAUDAL; PERINEURAL AS NEEDED
Status: DISCONTINUED | OUTPATIENT
Start: 2025-04-25 | End: 2025-04-26 | Stop reason: SURG

## 2025-04-25 RX ORDER — HYDROCODONE BITARTRATE AND ACETAMINOPHEN 7.5; 325 MG/1; MG/1
2 TABLET ORAL EVERY 6 HOURS PRN
Refills: 0 | Status: ACTIVE | OUTPATIENT
Start: 2025-04-25 | End: 2025-04-26

## 2025-04-25 RX ADMIN — LIDOCAINE HYDROCHLORIDE 5 ML: 10 INJECTION, SOLUTION EPIDURAL; INFILTRATION; INTRACAUDAL; PERINEURAL at 16:20:00

## 2025-04-25 RX ADMIN — MORPHINE SULFATE 1 MG: 1 INJECTION, SOLUTION EPIDURAL; INTRATHECAL; INTRAVENOUS at 23:39:00

## 2025-04-25 RX ADMIN — SODIUM CHLORIDE, SODIUM LACTATE, POTASSIUM CHLORIDE, CALCIUM CHLORIDE: 600; 310; 30; 20 INJECTION, SOLUTION INTRAVENOUS at 23:18:00

## 2025-04-25 RX ADMIN — LIDOCAINE HYDROCHLORIDE AND EPINEPHRINE 10 ML: 20; 5 INJECTION, SOLUTION EPIDURAL; INFILTRATION; INTRACAUDAL; PERINEURAL at 23:29:00

## 2025-04-25 RX ADMIN — LIDOCAINE HYDROCHLORIDE AND EPINEPHRINE 5 ML: 20; 5 INJECTION, SOLUTION EPIDURAL; INFILTRATION; INTRACAUDAL; PERINEURAL at 23:24:00

## 2025-04-25 RX ADMIN — MEPERIDINE HYDROCHLORIDE 12.5 MG: 25 INJECTION INTRAMUSCULAR; INTRAVENOUS; SUBCUTANEOUS at 23:43:00

## 2025-04-25 RX ADMIN — MORPHINE SULFATE 1 MG: 1 INJECTION, SOLUTION EPIDURAL; INTRATHECAL; INTRAVENOUS at 23:59:00

## 2025-04-25 RX ADMIN — METHYLERGONOVINE MALEATE 0.2 MG: 0.2 INJECTION INTRAVENOUS at 23:43:00

## 2025-04-25 RX ADMIN — LIDOCAINE HYDROCHLORIDE AND EPINEPHRINE 5 ML: 20; 5 INJECTION, SOLUTION EPIDURAL; INFILTRATION; INTRACAUDAL; PERINEURAL at 23:20:00

## 2025-04-25 RX ADMIN — TRANEXAMIC ACID 1000 MG: 10 INJECTION, SOLUTION INTRAVENOUS at 23:45:00

## 2025-04-25 RX ADMIN — DEXAMETHASONE SODIUM PHOSPHATE 4 MG: 4 MG/ML VIAL (ML) INJECTION at 23:19:00

## 2025-04-25 RX ADMIN — LIDOCAINE HYDROCHLORIDE AND EPINEPHRINE 5 ML: 15; 5 INJECTION, SOLUTION EPIDURAL at 16:26:00

## 2025-04-25 RX ADMIN — ONDANSETRON 4 MG: 2 INJECTION INTRAMUSCULAR; INTRAVENOUS at 23:19:00

## 2025-04-25 RX ADMIN — MORPHINE SULFATE 1 MG: 1 INJECTION, SOLUTION EPIDURAL; INTRATHECAL; INTRAVENOUS at 23:51:00

## 2025-04-25 RX ADMIN — MEPERIDINE HYDROCHLORIDE 12.5 MG: 25 INJECTION INTRAMUSCULAR; INTRAVENOUS; SUBCUTANEOUS at 23:39:00

## 2025-04-25 NOTE — PLAN OF CARE
Problem: Patient Centered Care  Goal: Patient preferences are identified and integrated in the patient's plan of care  Description: Interventions:- What would you like us to know as we care for you? We're having a boy.   - Provide timely, complete, and accurate information to patient/family- Incorporate patient and family knowledge, values, beliefs, and cultural backgrounds into the planning and delivery of care- Encourage patient/family to participate in care and decision-making at the level they choose- Honor patient and family perspectives and choices  Outcome: Progressing     Problem: Patient/Family Goals  Goal: Patient/Family Long Term Goal  Description: Patient's Long Term Goal: Maintain pregnancy    Interventions:  - Multidisciplinary approach to provide holistic care  - Provide disease specific education and reinforcement throughout hospitalization     Outcome: Progressing  Goal: Patient/Family Short Term Goal  Description: Patient's Short Term Goal: Understand anticipatory needs of  at this gestational age    Interventions:   - Neonatology consult   - Lactation consult    Outcome: Progressing     Problem: BIRTH - VAGINAL/ SECTION  Goal: Fetal and maternal status remain reassuring during the birth process  Description: INTERVENTIONS:- Monitor vital signs- Monitor fetal heart rate- Monitor uterine activity- Monitor labor progression (vaginal delivery)- DVT prophylaxis (C/S delivery)- Surgical antibiotic prophylaxis (C/S delivery)  Outcome: Progressing     Problem: PAIN - ADULT  Goal: Verbalizes/displays adequate comfort level or patient's stated pain goal  Description: INTERVENTIONS:- Encourage pt to monitor pain and request assistance- Assess pain using appropriate pain scale- Administer analgesics based on type and severity of pain and evaluate response- Implement non-pharmacological measures as appropriate and evaluate response- Consider cultural and social influences on pain and pain  management- Manage/alleviate anxiety- Utilize distraction and/or relaxation techniques- Monitor for opioid side effects- Notify MD/LIP if interventions unsuccessful or patient reports new pain- Anticipate increased pain with activity and pre-medicate as appropriate  Outcome: Progressing     Problem: ANXIETY  Goal: Will report anxiety at manageable levels  Description: INTERVENTIONS:- Administer medication as ordered- Teach and rehearse alternative coping skills- Provide emotional support with 1:1 interaction with staff  Outcome: Progressing

## 2025-04-25 NOTE — H&P
[facility]  part of [facility]    History and Physical Examination      Subjective   Chief Complaint:  eIOL    HISTORY OF PRESENT ILLNESS:        Patient is a 19 year old y/o   @ 39w2d weeks presents for elective IOL. She notes normal fetal movement, no vaginal bleeding, and  no ROM. Her prenatal course was uncomplated. Her prenatal care is up to date and reviewed. Patient's GBS is  positive    Lab Results   Component Value Date    GBS Positive (A) 2025           Past Medical History[1]    Past Surgical History[2]    OB History    Para Term  AB Living   1 0 0 0 0 0   SAB IAB Ectopic Multiple Live Births   0 0 0 0 0         Medications - Current[3]    Allergies[4]    Social History     Socioeconomic History    Marital status: Single     Spouse name: Not on file    Number of children: Not on file    Years of education: Not on file    Highest education level: Not on file   Occupational History    Not on file   Tobacco Use    Smoking status: Never     Passive exposure: Never    Smokeless tobacco: Never   Vaping Use    Vaping status: Never Used   Substance and Sexual Activity    Alcohol use: Never    Drug use: Not Currently     Types: Cannabis    Sexual activity: Yes     Partners: Male   Other Topics Concern    Not on file   Social History Narrative    Not on file     Social Drivers of Health     Food Insecurity: No Food Insecurity (2025)    NCSS - Food Insecurity     Worried About Running Out of Food in the Last Year: No     Ran Out of Food in the Last Year: No   Transportation Needs: No Transportation Needs (2025)    NCSS - Transportation     Lack of Transportation: No   Stress: No Stress Concern Present (2025)    Stress     Feeling of Stress : No   Housing Stability: At Risk (2025)    NCSS - Housing/Utilities     Has Housing: Yes     Worried About Losing Housing: No     Unable to Get Utilities: Yes         Family History[5]        Prior to Admission  medications    Medication Sig Start Date End Date Taking? Authorizing Provider   Prenatal 27-0.8 MG Oral Tab Take 1 tablet by mouth daily. 4/7/25  Yes External/Patient, Reported   Prenatal MV-Min-Fe Fum-FA-DHA (PRENATAL/FOLIC ACID+DHA) 27-0.8-200 MG Oral Cap Take 1 capsule by mouth daily. 4/7/25  Yes Nay Mendoza MD   ondansetron (ZOFRAN) 4 mg tablet Take 1 tablet (4 mg total) by mouth every 8 (eight) hours as needed for Nausea. 2/3/25  Yes Nay Mendoza MD   Aspirin 81 MG Oral Cap Take 2 capsules by mouth daily. 9/30/24  Yes Nay Mendoza MD   Ferrous Sulfate 325 (65 Fe) MG Oral Tab Take 1 tablet (325 mg total) by mouth daily with breakfast. 9/30/24  Yes Nay Mendoza MD   ondansetron 4 MG Oral Tablet Dispersible Take 1 tablet (4 mg total) by mouth every 4 (four) hours as needed for Nausea. 9/24/24  Yes Elieser Stockton MD   cholecalciferol 50 MCG (2000 UT) Oral Tab Take 1 tablet (2,000 Units total) by mouth daily. 11/11/24   External/Patient, Reported   metoclopramide (REGLAN) 10 MG Oral Tab Take 1 tablet (10 mg total) by mouth every 6 (six) hours as needed. 11/25/24   Nay Mendoza MD   CHLORPROMAZINE 50 MG Oral Tab TAKE 1/2 TABLET(25 MG) BY MOUTH THREE TIMES DAILY 11/11/24   Nay Mendoza MD   ascorbic acid 250 MG Oral Tab Take 1 tablet (250 mg total) by mouth every other day. 11/6/24   Nay Mendoza MD   calcium carbonate (TUMS) 500 MG Oral Chew Tab Chew 1 tablet (500 mg total) by mouth daily. 10/23/24   Nay Mendoza MD   famotidine 20 MG Oral Tab Take 1 tablet (20 mg total) by mouth 2 (two) times daily. 10/23/24   Nay Mendoza MD   diphenhydrAMINE HCl 50 MG Oral Tab Take 1 tablet (50 mg total) by mouth every 4 to 6 hours. 10/23/24   Nay Mendoza MD           Objective       ROS   Constitutional: Negative.  Negative for chills and fever.   Respiratory: Negative.  Negative for shortness of breath.    Cardiovascular: Negative for chest pain and palpitations.    Gastrointestinal: Negative for diarrhea, nausea and vomiting.   Genitourinary: Negative.  Negative for dysuria.   Neurological: Negative for dizziness.       Vitals:    Temp: 98.4 °F (36.9 °C)  Pulse: 78  Resp: 18  BP: 118/74     Physical Exam   Constitutional: She appears well-developed and well-nourished.   Cardiovascular: Normal rate.   Pulmonary/Chest: Effort normal.   Abdominal: Soft.   Genitourinary: Uterus normal.   Neurological: She is alert.   Skin: Skin is warm.   Psychiatric: She has a normal mood and affect.       CE:  C//L/H/soft     .    EFM:   Baseline 140, + Accels, no Decels, mod Variability    Brandt:  Acontractile      Cephalic on bedside US    Lab Results   Component Value Date    ABO BLOOD TYPE O 04/24/2025    RH BLOOD TYPE Positive 04/24/2025    HGB 10.7 (L) 04/24/2025    .0 04/24/2025    HCT 32.3 (L) 04/24/2025    Rubella IgG Positive 11/05/2024    Treponemal Antibodies Nonreactive 04/24/2025    HIV Antigen Antibody Combo Non-Reactive 03/03/2025    V. Zoster IgG Immunity 0.105 (L) 11/05/2024            ASSESSMENT AND PLAN:      Active Problems:    Pregnant (HCC)    Pregnancy (HCC)        Patient admitted for eIOL. 50 mcg buccal cytotec q4h for 2-4 doses pending response. GBS ppx to start at 2 cm dilated  Pain medication as desired  Good FM noted, fetal monitoring strip reviewed and reassuring.      Mirtha Holland MD         [1]   Past Medical History:   Anxiety    Decorative tattoo   [2]   Past Surgical History:  Procedure Laterality Date    Tonsillectomy     [3] No current outpatient medications on file.  [4] No Known Allergies  [5]   Family History  Problem Relation Age of Onset    Diabetes Mother     Diabetes Maternal Grandmother

## 2025-04-25 NOTE — ANESTHESIA PREPROCEDURE EVALUATION
Anesthesia PreOp Note    HPI:     Haritha Cesar is a 19 year old female who presents for preoperative consultation requested by: * No surgeons listed *    Date of Surgery: 4/25/2025    * No procedures listed *  Indication: * No pre-op diagnosis entered *    Relevant Problems   No relevant active problems       NPO:                         History Review:  Patient Active Problem List    Diagnosis Date Noted    Pregnant ([facility]) 04/24/2025    Pregnancy ([facility]) 04/24/2025    Maternal varicella, non-immune ([facility]) 11/08/2024    Nausea/vomiting in pregnancy ([facility]) 09/05/2024    Uterine size-date discrepancy in first trimester ([facility]) [O26.841] 09/05/2024       Past Medical History[1]    Past Surgical History[2]    Prescriptions Prior to Admission[3]  Current Medications and Prescriptions Ordered in Epic[4]    Allergies[5]    Family History[6]  Social Hx on file[7]    Available pre-op labs reviewed.  Lab Results   Component Value Date    WBC 10.2 04/24/2025    RBC 3.79 (L) 04/24/2025    HGB 10.7 (L) 04/24/2025    HCT 32.3 (L) 04/24/2025    MCV 85.2 04/24/2025    MCH 28.2 04/24/2025    MCHC 33.1 04/24/2025    RDW 13.9 04/24/2025    .0 04/24/2025             Vital Signs:  There is no height or weight on file to calculate BMI.   oral temperature is 98.5 °F (36.9 °C). Her blood pressure is 130/82 and her pulse is 79. Her respiration is 16.   Vitals:    04/25/25 0630 04/25/25 0800 04/25/25 1000 04/25/25 1200   BP: 123/71  117/67 130/82   Pulse: 88  84 79   Resp:    16   Temp:  98.5 °F (36.9 °C)  98.5 °F (36.9 °C)   TempSrc:  Oral  Oral        Anesthesia Evaluation     Patient summary reviewed and Nursing notes reviewed    No history of anesthetic complications   Airway   TM distance: >3 FB  Neck ROM: full  Dental      Pulmonary - negative ROS and normal exam   (-) asthma, shortness of breath, recent URI  Cardiovascular - negative ROS  Exercise tolerance: good  (-) hypertension, dysrhythmias    Rhythm: regular  Rate: normal     Neuro/Psych    (+)  anxiety/panic attacks,      (-) seizures, neuromuscular disease    GI/Hepatic/Renal - negative ROS   (-) hepatitis, liver disease, renal disease    Endo/Other - negative ROS   (-) diabetes mellitus, blood dyscrasia  Abdominal  - normal exam                 Anesthesia Plan:   ASA:  2  Plan:   Epidural  Post-op Pain Management: Intrathecal narcotics and Continuous epidural  Informed Consent Plan and Risks Discussed With:  Patient      I have informed Haritha Cesar and/or legal guardian or family member of the nature of the anesthetic plan, benefits, risks including possible dental damage if relevant, major complications, and any alternative forms of anesthetic management.   All of the patient's questions were answered to the best of my ability. The patient desires the anesthetic management as planned.  Marie Jenkins DO  4/25/2025 4:14 PM  Present on Admission:  **None**           [1]   Past Medical History:   Anxiety    Decorative tattoo   [2]   Past Surgical History:  Procedure Laterality Date    Tonsillectomy     [3]   Medications Prior to Admission   Medication Sig Dispense Refill Last Dose/Taking    Prenatal 27-0.8 MG Oral Tab Take 1 tablet by mouth daily.   Taking    Prenatal MV-Min-Fe Fum-FA-DHA (PRENATAL/FOLIC ACID+DHA) 27-0.8-200 MG Oral Cap Take 1 capsule by mouth daily. 90 capsule 2 Taking    ondansetron (ZOFRAN) 4 mg tablet Take 1 tablet (4 mg total) by mouth every 8 (eight) hours as needed for Nausea. 30 tablet 0 Taking As Needed    Aspirin 81 MG Oral Cap Take 2 capsules by mouth daily. 180 capsule 2 Taking    Ferrous Sulfate 325 (65 Fe) MG Oral Tab Take 1 tablet (325 mg total) by mouth daily with breakfast. 90 tablet 2 Taking    ondansetron 4 MG Oral Tablet Dispersible Take 1 tablet (4 mg total) by mouth every 4 (four) hours as needed for Nausea. 15 tablet 0 Taking As Needed    cholecalciferol 50 MCG (2000 UT) Oral Tab Take 1 tablet (2,000 Units total) by mouth daily.        metoclopramide (REGLAN) 10 MG Oral Tab Take 1 tablet (10 mg total) by mouth every 6 (six) hours as needed. 20 tablet 0     CHLORPROMAZINE 50 MG Oral Tab TAKE 1/2 TABLET(25 MG) BY MOUTH THREE TIMES DAILY 30 tablet 0     ascorbic acid 250 MG Oral Tab Take 1 tablet (250 mg total) by mouth every other day. 90 tablet 2     calcium carbonate (TUMS) 500 MG Oral Chew Tab Chew 1 tablet (500 mg total) by mouth daily. 90 tablet 0     famotidine 20 MG Oral Tab Take 1 tablet (20 mg total) by mouth 2 (two) times daily. 60 tablet 5     diphenhydrAMINE HCl 50 MG Oral Tab Take 1 tablet (50 mg total) by mouth every 4 to 6 hours. 90 tablet 1    [4]   Current Facility-Administered Medications Ordered in Epic   Medication Dose Route Frequency Provider Last Rate Last Admin    fentaNYL-bupivacaine 2 mcg/mL-0.125% in sodium chloride 0.9% 100 mL EPIDURAL infusion premix   Epidural Continuous Marie Jenkins DO        fentaNYL (Sublimaze) 50 mcg/mL injection 100 mcg  100 mcg Epidural Once Marie Jenkins DO        bupivacaine PF (Marcaine) 0.25% injection  20 mL Epidural Once Marie Jenkins DO        ePHEDrine (PF) 25 MG/5 ML injection 5 mg  5 mg Intravenous PRN Marie Jenkins DO        nalbuphine (Nubain) 10 mg/mL injection 2.5 mg  2.5 mg Intravenous Q15 Min PRN Marie Jenkins DO        acetaminophen (Tylenol Extra Strength) tab 500 mg  500 mg Oral Q6H PRN Mirtha Holland MD        acetaminophen (Tylenol Extra Strength) tab 1,000 mg  1,000 mg Oral Q6H PRN Mirtha Holland MD        ibuprofen (Motrin) tab 600 mg  600 mg Oral Once PRN Mirtha Holland MD        ondansetron (Zofran) 4 MG/2ML injection 4 mg  4 mg Intravenous Q6H PRN Mirtha Holland MD   4 mg at 04/24/25 4473    oxyTOCIN in sodium chloride 0.9% (Pitocin) 30 Units/500mL infusion premix  62.5-900 jewel-units/min Intravenous Continuous Mirtha Holland MD        terbutaline (Brethine) 1 MG/ML injection 0.25 mg  0.25 mg Subcutaneous PRN Goodenow,  MD Mirtha        sodium citrate-citric acid (Bicitra) 500-334 MG/5ML oral solution 30 mL  30 mL Oral PRN Mirtha Holland MD        lidocaine PF (Xylocaine-MPF) 1% injection  30 mL Intradermal Once Mirtha Holland MD        lactated ringers infusion   Intravenous Continuous Mirtha Holland  mL/hr at 04/25/25 1613 New Bag at 04/25/25 1613    dextrose in lactated ringers 5% infusion   Intravenous PRN Mirtha Holland MD        lactated ringers IV bolus 500 mL  500 mL Intravenous PRN Mirtha Holland MD        ampicillin (Omnipen) 1 g in sodium chloride 0.9% 100mL IVPB-DERRICK  1 g Intravenous Q4H Mirtha Holland MD        metoclopramide (Reglan) 5 mg/mL injection 10 mg  10 mg Intravenous Q6H PRN Mirtha Holland MD        calcium carbonate (Tums) chewable tab 1,000 mg  1,000 mg Oral Q4H PRN Mirtha Holland MD        acetaminophen (Tylenol Extra Strength) tab 500 mg  500 mg Oral Q6H PRN Mirtha Holland MD        acetaminophen (Tylenol Extra Strength) tab 1,000 mg  1,000 mg Oral Q6H PRN Mirtha Holland MD        ibuprofen (Motrin) tab 600 mg  600 mg Oral Once PRN Mirtha Holland MD        ondansetron (Zofran) 4 MG/2ML injection 4 mg  4 mg Intravenous Q6H PRN Mirtha Holland MD   4 mg at 04/25/25 0925    oxyTOCIN in sodium chloride 0.9% (Pitocin) 30 Units/500mL infusion premix  62.5-900 jewel-units/min Intravenous Continuous Mirtha Holland MD        terbutaline (Brethine) 1 MG/ML injection 0.25 mg  0.25 mg Subcutaneous PRN Mirtha Holland MD        sodium citrate-citric acid (Bicitra) 500-334 MG/5ML oral solution 30 mL  30 mL Oral PRN Mirtha Holland MD        lidocaine PF (Xylocaine-MPF) 1% injection  30 mL Intradermal Once Mirtha Holland MD        lactated ringers infusion   Intravenous Continuous Mirtha Holland MD        dextrose in lactated ringers 5% infusion   Intravenous PRN Mirtha Holland MD        lactated ringers IV bolus 500 mL  500 mL Intravenous PRN Mirtha Holland MD        nalbuphine  (Nubain) 10 mg/mL injection 10 mg  10 mg Intramuscular Q6H PRN Mirtha Holland MD   10 mg at 04/25/25 0943    And    hydrOXYzine 50 mg/mL injection 50 mg  50 mg Intramuscular Q6H PRN Mirtha Holland MD   50 mg at 04/25/25 0956    fentaNYL (Sublimaze) 50 mcg/mL injection 100 mcg  100 mcg Intravenous Once Mirtha Holland MD        fentaNYL (Sublimaze) 50 mcg/mL injection 50 mcg  50 mcg Intravenous Q30 Min PRN Mirtha Holland MD         No current [facility]-ordered outpatient medications on file.   [5] No Known Allergies  [6]   Family History  Problem Relation Age of Onset    Diabetes Mother     Diabetes Maternal Grandmother    [7]   Social History  Socioeconomic History    Marital status: Single   Tobacco Use    Smoking status: Never     Passive exposure: Never    Smokeless tobacco: Never   Vaping Use    Vaping status: Never Used   Substance and Sexual Activity    Alcohol use: Never    Drug use: Not Currently     Types: Cannabis    Sexual activity: Yes     Partners: Male

## 2025-04-26 LAB
DEPRECATED RDW RBC AUTO: 44.3 FL (ref 35.1–46.3)
ERYTHROCYTE [DISTWIDTH] IN BLOOD BY AUTOMATED COUNT: 14.2 % (ref 11–15)
HCT VFR BLD AUTO: 25.3 % (ref 35–48)
HGB BLD-MCNC: 8.7 G/DL (ref 12–16)
MCH RBC QN AUTO: 29.5 PG (ref 26–34)
MCHC RBC AUTO-ENTMCNC: 34.4 G/DL (ref 31–37)
MCV RBC AUTO: 85.8 FL (ref 80–100)
PLATELET # BLD AUTO: 303 10(3)UL (ref 150–450)
RBC # BLD AUTO: 2.95 X10(6)UL (ref 3.8–5.3)
WBC # BLD AUTO: 17.1 X10(3) UL (ref 4–11)

## 2025-04-26 PROCEDURE — 59514 CESAREAN DELIVERY ONLY: CPT | Performed by: OBSTETRICS & GYNECOLOGY

## 2025-04-26 RX ORDER — KETOROLAC TROMETHAMINE 30 MG/ML
30 INJECTION, SOLUTION INTRAMUSCULAR; INTRAVENOUS ONCE
Status: COMPLETED | OUTPATIENT
Start: 2025-04-26 | End: 2025-04-26

## 2025-04-26 RX ORDER — ONDANSETRON 2 MG/ML
4 INJECTION INTRAMUSCULAR; INTRAVENOUS EVERY 6 HOURS PRN
Status: DISCONTINUED | OUTPATIENT
Start: 2025-04-26 | End: 2025-04-28

## 2025-04-26 RX ORDER — NALBUPHINE HYDROCHLORIDE 10 MG/ML
2.5 INJECTION INTRAMUSCULAR; INTRAVENOUS; SUBCUTANEOUS
Status: DISCONTINUED | OUTPATIENT
Start: 2025-04-26 | End: 2025-04-28

## 2025-04-26 RX ORDER — SIMETHICONE 80 MG
80 TABLET,CHEWABLE ORAL 3 TIMES DAILY PRN
Status: DISCONTINUED | OUTPATIENT
Start: 2025-04-26 | End: 2025-04-28

## 2025-04-26 RX ORDER — ONDANSETRON 2 MG/ML
4 INJECTION INTRAMUSCULAR; INTRAVENOUS ONCE AS NEEDED
Status: ACTIVE | OUTPATIENT
Start: 2025-04-26 | End: 2025-04-26

## 2025-04-26 RX ORDER — GABAPENTIN 300 MG/1
300 CAPSULE ORAL EVERY 8 HOURS PRN
Status: DISCONTINUED | OUTPATIENT
Start: 2025-04-26 | End: 2025-04-28

## 2025-04-26 RX ORDER — AMMONIA 15 % (W/V)
0.3 AMPUL (EA) INHALATION AS NEEDED
Status: DISCONTINUED | OUTPATIENT
Start: 2025-04-26 | End: 2025-04-28

## 2025-04-26 RX ORDER — ACETAMINOPHEN 500 MG
1000 TABLET ORAL EVERY 6 HOURS
Status: DISCONTINUED | OUTPATIENT
Start: 2025-04-26 | End: 2025-04-28

## 2025-04-26 RX ORDER — IBUPROFEN 600 MG/1
600 TABLET, FILM COATED ORAL EVERY 6 HOURS
Status: DISCONTINUED | OUTPATIENT
Start: 2025-04-27 | End: 2025-04-28

## 2025-04-26 RX ORDER — SODIUM CHLORIDE, SODIUM LACTATE, POTASSIUM CHLORIDE, CALCIUM CHLORIDE 600; 310; 30; 20 MG/100ML; MG/100ML; MG/100ML; MG/100ML
INJECTION, SOLUTION INTRAVENOUS CONTINUOUS
Status: DISCONTINUED | OUTPATIENT
Start: 2025-04-26 | End: 2025-04-28

## 2025-04-26 RX ORDER — KETOROLAC TROMETHAMINE 30 MG/ML
30 INJECTION, SOLUTION INTRAMUSCULAR; INTRAVENOUS EVERY 6 HOURS
Status: COMPLETED | OUTPATIENT
Start: 2025-04-26 | End: 2025-04-27

## 2025-04-26 RX ORDER — DOCUSATE SODIUM 100 MG/1
100 CAPSULE, LIQUID FILLED ORAL
Status: DISCONTINUED | OUTPATIENT
Start: 2025-04-26 | End: 2025-04-28

## 2025-04-26 RX ORDER — BISACODYL 10 MG
10 SUPPOSITORY, RECTAL RECTAL ONCE AS NEEDED
Status: DISCONTINUED | OUTPATIENT
Start: 2025-04-26 | End: 2025-04-28

## 2025-04-26 NOTE — PLAN OF CARE
Problem: Patient Centered Care  Goal: Patient preferences are identified and integrated in the patient's plan of care  Description: Interventions:- What would you like us to know as we care for you? We're having a boy.   - Provide timely, complete, and accurate information to patient/family- Incorporate patient and family knowledge, values, beliefs, and cultural backgrounds into the planning and delivery of care- Encourage patient/family to participate in care and decision-making at the level they choose- Honor patient and family perspectives and choices  Outcome: Progressing     Problem: Patient/Family Goals  Goal: Patient/Family Long Term Goal  Description: Patient's Long Term Goal: Maintain pregnancy    Interventions:  - Multidisciplinary approach to provide holistic care  - Provide disease specific education and reinforcement throughout hospitalization     Outcome: Progressing  Goal: Patient/Family Short Term Goal  Description: Patient's Short Term Goal: Understand anticipatory needs of  at this gestational age    Interventions:   - Neonatology consult   - Lactation consult    Outcome: Progressing     Problem: GASTROINTESTINAL - ADULT  Goal: Minimal or absence of nausea and vomiting  Description: INTERVENTIONS:- Maintain adequate hydration with IV or PO as ordered and tolerated- Nasogastric tube to low intermittent suction as ordered- Evaluate effectiveness of ordered antiemetic medications- Provide nonpharmacologic comfort measures as appropriate- Advance diet as tolerated, if ordered- Obtain nutritional consult as needed- Evaluate fluid balance  Outcome: Progressing  Goal: Maintains or returns to baseline bowel function  Description: INTERVENTIONS:- Assess bowel function- Maintain adequate hydration with IV or PO as ordered and tolerated- Evaluate effectiveness of GI medications- Encourage mobilization and activity- Obtain nutritional consult as needed- Establish a toileting routine/schedule- Consider  collaborating with pharmacy to review patient's medication profile  Outcome: Progressing  Goal: Maintains adequate nutritional intake (undernourished)  Description: INTERVENTIONS:- Monitor percentage of each meal consumed- Identify factors contributing to decreased intake, treat as appropriate- Assist with meals as needed- Monitor I&O, WT and lab values- Obtain nutritional consult as needed- Optimize oral hygiene and moisture- Encourage food from home; allow for food preferences- Enhance eating environment  Outcome: Progressing  Goal: Achieves appropriate nutritional intake (bariatric)  Description: INTERVENTIONS:- Monitor for over-consumption- Identify factors contributing to increased intake, treat as appropriate- Monitor I&O, WT and lab values- Obtain nutritional consult as needed- Evaluate psychosocial factors contributing to over-consumption  Outcome: Progressing     Problem: GENITOURINARY - ADULT  Goal: Absence of urinary retention  Description: INTERVENTIONS:- Assess patient’s ability to void and empty bladder- Monitor intake/output and perform bladder scan as needed- Follow urinary retention protocol/standard of care- Consider collaborating with pharmacy to review patient's medication profile- Implement strategies to promote bladder emptying  Outcome: Progressing     Problem: POSTPARTUM  Goal: Long Term Goal:Experiences normal postpartum course  Description: INTERVENTIONS:- Assess and monitor vital signs and lab values.- Assess fundus and lochia.- Provide ice/sitz baths for perineum discomfort.- Monitor healing of incision/episiotomy/laceration, and assess for signs and symptoms of infection and hematoma.- Assess bladder function and monitor for bladder distention.- Provide/instruct/assist with pericare as needed.- Provide VTE prophylaxis as needed.- Monitor bowel function.- Encourage ambulation and provide assistance as needed.- Assess and monitor emotional status and provide social service/psych resources  as needed.- Utilize standard precautions and use personal protective equipment as indicated. Ensure aseptic care of all intravenous lines and invasive tubes/drains.- Obtain immunization and exposure to communicable diseases history.  Outcome: Progressing  Goal: Optimize infant feeding at the breast  Description: INTERVENTIONS:- Initiate breast feeding within first hour after birth. - Monitor effectiveness of current breast feeding efforts.- Assess support systems available to mother/family.- Identify cultural beliefs/practices regarding lactation, letdown techniques, maternal food preferences.- Assess mother's knowledge and previous experience with breast feeding.- Provide information as needed about early infant feeding cues (e.g., rooting, lip smacking, sucking fingers/hand) versus late cue of crying.- Discuss/demonstrate breast feeding aids (e.g., infant sling, nursing footstool/pillows, and breast pumps).- Encourage mother/other family members to express feelings/concerns, and actively listen.- Educate father/SO about benefits of breast feeding and how to manage common lactation challenges.- Recommend avoidance of specific medications or substances incompatible with breast feeding.- Assess and monitor for signs of nipple pain/trauma.- Instruct and provide assistance with proper latch.- Review techniques for milk expression (breast pumping) and storage of breast milk. Provide pumping equipment/supplies, instructions and assistance, as needed.- Encourage rooming-in and breast feeding on demand.- Encourage skin-to-skin contact.- Provide LC support as needed.- Assess for and manage engorgement.- Provide breast feeding education handouts and information on community breast feeding support.   Outcome: Progressing  Goal: Establishment of adequate milk supply with medication/procedure interruptions  Description: INTERVENTIONS:- Review techniques for milk expression (breast pumping). - Provide pumping equipment/supplies,  instructions, and assistance until it is safe to breastfeed infant.  Outcome: Progressing  Goal: Experiences normal breast weaning course  Description: INTERVENTIONS:- Assess for and manage engorgement.- Instruct on breast care.- Provide comfort measures.  Outcome: Progressing  Goal: Appropriate maternal -  bonding  Description: INTERVENTIONS:- Assess caregiver- interactions.- Assess caregiver's emotional status and coping mechanisms.- Encourage caregiver to participate in  daily care.- Assess support systems available to mother/family.- Provide /case management support as needed.  Outcome: Progressing

## 2025-04-26 NOTE — ANESTHESIA POSTPROCEDURE EVALUATION
Patient: Haritha Cesar    Procedure Summary       Date: 25 Room / Location: [facility] L+D OR  [facility] L+D OR    Anesthesia Start:  Anesthesia Stop: 25    Procedure:  SECTION (Abdomen) Diagnosis:       Fetal intolerance to labor, delivered, current hospitalization (HCC)      (Fetal intolerance to labor, delivered, current hospitalization (HCC) [O77.9])    Surgeons: Wesley Jenkins MD Anesthesiologist: Marie Jenkins DO    Anesthesia Type: epidural ASA Status: 2            Anesthesia Type: epidural    Vitals Value Taken Time   /108 25 00:10   Temp 97.4 °F (37 °C) 25 00:10   Pulse 95 25 00:10   Resp 23 25 00:10   SpO2 99 % 25 00:10   Vitals shown include unfiled device data.    EM AN Post Evaluation:   Patient Evaluated in PACU  Patient Participation: complete - patient participated  Level of Consciousness: awake and alert  Pain Score: 0  Pain Management: adequate  Airway Patency:patent  Dental exam unchanged from preop  Yes    Nausea/Vomiting: none  Cardiovascular Status: hemodynamically stable  Respiratory Status: spontaneous ventilation, unassisted, nonlabored ventilation and room air  Postoperative Hydration stable      Marie Jenkins DO  2025 2:58 AM

## 2025-04-26 NOTE — PROGRESS NOTES
POD 1    Pt doing well.  No c/o.      Alert and oriented nad   Abd soft nontender fundus firm  Ext nt    A./P POD 1  pt doing well.  No c/o.   Cbc not noted,  reordered now.

## 2025-04-26 NOTE — LACTATION NOTE
This note was copied from a baby's chart.     04/26/25 1500   Evaluation Type   Evaluation Type Inpatient   Problems & Assessment   Problems Diagnosed or Identified Latch difficulty;Sleepy   Problems: comment/detail Infant 15 hours old   Infant Assessment Minimal hunger cues present;Skin color: pink or appropriate for ethnicity   Muscle tone Appropriate for GA   Feeding Assessment   Summary Current Feeding Breastfeeding with formula supplement   Last 24 hour feeding summary See I and O   Breastfeeding Assessment Assisted with breastfeeding w/mother's permission;Sleepy infant, recently fed;Tolerated feeding well   Breastfeeding Positions cross cradle;right breast   Latch 0   Audible Sucks/Swallows 0   Type of Nipple 2   Comfort (Breast/Nipple) 2   Hold (Positioning) 1   LATCH Score 5   Other (comment) LC to assist with latch. Several visitors throughout the day. Attempted latch in cross cradle hold on R breast. Infant sleepy and disinterested. Reviewed pumping/supplementation guidelines, hand expression, skin to skin benefits, expected I and O of infant. Feeding plan in place will follow up in am.

## 2025-04-26 NOTE — LACTATION NOTE
04/26/25 1500   Evaluation Type   Evaluation Type Inpatient   Problems identified   Problems identified Knowledge deficit   Maternal history   Maternal history Caesarean section   Breastfeeding goal   Breastfeeding goal To maintain breast milk feeding per patient goal   Maternal Assessment   Bilateral Breasts Soft;Symmetrical;Wide spaced   Bilateral Nipples Colostrum difficult to express;Everted   Prior breastfeeding experience (comment below) Primip   Breastfeeding Assistance Breastfeeding assistance provided with permission;Breast exam provided with permission;Hand expression provided with permission   Pain assessment   Location/Comment Denies   Treatment of Sore Nipples Expressed breast milk   Guidelines for use of:   Breast pump type Hand Pump   Suggested use of pump Pump if infant is not latching to breast;Pump each time a supplement is offered   Other (comment) LC to assist with latch.  Several visitors throughout the day. Attempted latch in cross cradle hold on R breast.  Infant sleepy and disinterested.  Reviewed pumping/supplementation guidelines, hand expression, skin to skin benefits, expected I and O of infant. Feeding plan in place will follow up in am.

## 2025-04-26 NOTE — L&D DELIVERY NOTE
[facility]  part of [facility]     Section Delivery Note    Haritha Cesar Patient Status:  Inpatient    2005 MRN [id number]   Location [facility] Attending Wesley Jenkins MD   Hosp Day # 2 PCP DEISY JAIME MD     Pre Op Diagnosis: arrest of dilitation and fetal intolerance to labor  Post Op Diagnosis: same as pre-op  Procedure:   Surgical/Procedural Cases on this Admission       Case IDs Date Procedure Surgeon Location Status    [id number] 25  SECTION Wesley Jenkins MD [facility] L[facility]D OR [facility]          [facility]    Surgeon:  Wesley Jenkins MD  Assistant Surgeon:  mayuri brennan md   Anesthesia: epidural  Complications: none  Neonatologist Present: yes  Findings: normal uterus    Delivery     Infant  Date of Delivery: 2025   Time of Delivery: 11:37 PM  Delivery Type: Caesarean Section    Infant Sex/Birthweight: male 6 lb 10.2 oz (3.01 kg)    Presentation Vertex [1]  Position          Apgars:  1 minute: 8               5 minutes: 9                        10 minutes:      Placenta  Date/Time of Delivery:     Delivery: spontaneous  Placenta to Pathology: no  Cord Gases Submitted: yes  Cord Blood Collection: yes  Cord Tissue Collection:no   Cord Complications: none  Sponge and Needle Counts:  Verified: yes  Delivery Comment:  infant noted occiput posterior,  with caput noted.   Dictation Number:  pending.     Input/Output   EBL:  pending.     Delivery Fluids:  LR, pending*   Urine Output: pendingml  Urine Color: clear    Wesley Jenkins MD  2025  12:14 AM

## 2025-04-26 NOTE — PROGRESS NOTES
Pt transferred to room 358 in stable condition. Received report from ZARIA Coker&DORIS BRICEÑO. VSS, afebrile. Fundus is firm U/U, bleeding is small. ABD dressing dry and intact. SCD's on. Alicia in place. Plan of care and paperwork reviewed with pt. Questions and concerns answered. Will continue with plan of care.

## 2025-04-26 NOTE — ANESTHESIA POST-OP FOLLOW-UP NOTE
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 Moving extremities.    Assessment:  Pain controlled with pain meds.    Plan:  CPM    Total visit time 9 minutes    PATRICK COOK MD  Anesthesia Acute Pain Service [id number]       [1]    acetaminophen  1,000 mg Oral Q6H    ketorolac  30 mg Intravenous Q6H    [START ON 2025] ibuprofen  600 mg Oral Q6H    docusate sodium  100 mg Oral BID@0600,1800    ceFAZolin        meperidine        tranexamic acid in sodium chloride 0.7%        methylergonovine       [2]    lactated ringers      oxyTOCIN in sodium chloride 0.9% 62.5 jewel-units/min (25 0437)   [3]   ondansetron    nalbuphine    gabapentin    magnesium hydroxide    bisacodyl    ondansetron    witch hazel-glycerin    phenylephrine-min oil-carole    simethicone    ammonia aromatic    ceFAZolin    meperidine    naloxone    acetaminophen    HYDROcodone-acetaminophen    HYDROcodone-acetaminophen    HYDROmorphone    HYDROmorphone    diphenhydrAMINE **OR** diphenhydrAMINE    nalbuphine    tranexamic acid in sodium chloride 0.7%    methylergonovine

## 2025-04-26 NOTE — PROGRESS NOTES
Patient transferred to mother/baby room 358 per cart in stable condition with baby and personal belongings.  Accompanied by  and staff.  Report given to mother/baby RN Mikaela

## 2025-04-27 LAB
BASOPHILS # BLD AUTO: 0.03 X10(3) UL (ref 0–0.2)
BASOPHILS NFR BLD AUTO: 0.3 %
DEPRECATED RDW RBC AUTO: 44.3 FL (ref 35.1–46.3)
EOSINOPHIL # BLD AUTO: 0.19 X10(3) UL (ref 0–0.7)
EOSINOPHIL NFR BLD AUTO: 1.7 %
ERYTHROCYTE [DISTWIDTH] IN BLOOD BY AUTOMATED COUNT: 14.4 % (ref 11–15)
HCT VFR BLD AUTO: 23.4 % (ref 35–48)
HGB BLD-MCNC: 7.8 G/DL (ref 12–16)
IMM GRANULOCYTES # BLD AUTO: 0.06 X10(3) UL (ref 0–1)
IMM GRANULOCYTES NFR BLD: 0.5 %
LYMPHOCYTES # BLD AUTO: 3.92 X10(3) UL (ref 1.5–5)
LYMPHOCYTES NFR BLD AUTO: 34.6 %
MCH RBC QN AUTO: 28.7 PG (ref 26–34)
MCHC RBC AUTO-ENTMCNC: 33.3 G/DL (ref 31–37)
MCV RBC AUTO: 86 FL (ref 80–100)
MONOCYTES # BLD AUTO: 0.73 X10(3) UL (ref 0.1–1)
MONOCYTES NFR BLD AUTO: 6.4 %
NEUTROPHILS # BLD AUTO: 6.41 X10 (3) UL (ref 1.5–7.7)
NEUTROPHILS # BLD AUTO: 6.41 X10(3) UL (ref 1.5–7.7)
NEUTROPHILS NFR BLD AUTO: 56.5 %
PLATELET # BLD AUTO: 283 10(3)UL (ref 150–450)
RBC # BLD AUTO: 2.72 X10(6)UL (ref 3.8–5.3)
WBC # BLD AUTO: 11.3 X10(3) UL (ref 4–11)

## 2025-04-27 NOTE — PLAN OF CARE
Problem: Patient Centered Care  Goal: Patient preferences are identified and integrated in the patient's plan of care  Description: Interventions:- What would you like us to know as we care for you? We're having a boy.   - Provide timely, complete, and accurate information to patient/family- Incorporate patient and family knowledge, values, beliefs, and cultural backgrounds into the planning and delivery of care- Encourage patient/family to participate in care and decision-making at the level they choose- Honor patient and family perspectives and choices  Outcome: Progressing     Problem: Patient/Family Goals  Goal: Patient/Family Long Term Goal  Description: Patient's Long Term Goal: Maintain pregnancy    Interventions:  - Multidisciplinary approach to provide holistic care  - Provide disease specific education and reinforcement throughout hospitalization     Outcome: Progressing  Goal: Patient/Family Short Term Goal  Description: Patient's Short Term Goal: Understand anticipatory needs of  at this gestational age    Interventions:   - Neonatology consult   - Lactation consult    Outcome: Progressing     Problem: GASTROINTESTINAL - ADULT  Goal: Minimal or absence of nausea and vomiting  Description: INTERVENTIONS:- Maintain adequate hydration with IV or PO as ordered and tolerated- Nasogastric tube to low intermittent suction as ordered- Evaluate effectiveness of ordered antiemetic medications- Provide nonpharmacologic comfort measures as appropriate- Advance diet as tolerated, if ordered- Obtain nutritional consult as needed- Evaluate fluid balance  Outcome: Progressing  Goal: Maintains or returns to baseline bowel function  Description: INTERVENTIONS:- Assess bowel function- Maintain adequate hydration with IV or PO as ordered and tolerated- Evaluate effectiveness of GI medications- Encourage mobilization and activity- Obtain nutritional consult as needed- Establish a toileting routine/schedule- Consider  collaborating with pharmacy to review patient's medication profile  Outcome: Progressing  Goal: Maintains adequate nutritional intake (undernourished)  Description: INTERVENTIONS:- Monitor percentage of each meal consumed- Identify factors contributing to decreased intake, treat as appropriate- Assist with meals as needed- Monitor I&O, WT and lab values- Obtain nutritional consult as needed- Optimize oral hygiene and moisture- Encourage food from home; allow for food preferences- Enhance eating environment  Outcome: Progressing  Goal: Achieves appropriate nutritional intake (bariatric)  Description: INTERVENTIONS:- Monitor for over-consumption- Identify factors contributing to increased intake, treat as appropriate- Monitor I&O, WT and lab values- Obtain nutritional consult as needed- Evaluate psychosocial factors contributing to over-consumption  Outcome: Progressing     Problem: GENITOURINARY - ADULT  Goal: Absence of urinary retention  Description: INTERVENTIONS:- Assess patient’s ability to void and empty bladder- Monitor intake/output and perform bladder scan as needed- Follow urinary retention protocol/standard of care- Consider collaborating with pharmacy to review patient's medication profile- Implement strategies to promote bladder emptying  Outcome: Progressing     Problem: POSTPARTUM  Goal: Long Term Goal:Experiences normal postpartum course  Description: INTERVENTIONS:- Assess and monitor vital signs and lab values.- Assess fundus and lochia.- Provide ice/sitz baths for perineum discomfort.- Monitor healing of incision/episiotomy/laceration, and assess for signs and symptoms of infection and hematoma.- Assess bladder function and monitor for bladder distention.- Provide/instruct/assist with pericare as needed.- Provide VTE prophylaxis as needed.- Monitor bowel function.- Encourage ambulation and provide assistance as needed.- Assess and monitor emotional status and provide social service/psych resources  as needed.- Utilize standard precautions and use personal protective equipment as indicated. Ensure aseptic care of all intravenous lines and invasive tubes/drains.- Obtain immunization and exposure to communicable diseases history.  Outcome: Progressing  Goal: Optimize infant feeding at the breast  Description: INTERVENTIONS:- Initiate breast feeding within first hour after birth. - Monitor effectiveness of current breast feeding efforts.- Assess support systems available to mother/family.- Identify cultural beliefs/practices regarding lactation, letdown techniques, maternal food preferences.- Assess mother's knowledge and previous experience with breast feeding.- Provide information as needed about early infant feeding cues (e.g., rooting, lip smacking, sucking fingers/hand) versus late cue of crying.- Discuss/demonstrate breast feeding aids (e.g., infant sling, nursing footstool/pillows, and breast pumps).- Encourage mother/other family members to express feelings/concerns, and actively listen.- Educate father/SO about benefits of breast feeding and how to manage common lactation challenges.- Recommend avoidance of specific medications or substances incompatible with breast feeding.- Assess and monitor for signs of nipple pain/trauma.- Instruct and provide assistance with proper latch.- Review techniques for milk expression (breast pumping) and storage of breast milk. Provide pumping equipment/supplies, instructions and assistance, as needed.- Encourage rooming-in and breast feeding on demand.- Encourage skin-to-skin contact.- Provide LC support as needed.- Assess for and manage engorgement.- Provide breast feeding education handouts and information on community breast feeding support.   Outcome: Progressing  Goal: Establishment of adequate milk supply with medication/procedure interruptions  Description: INTERVENTIONS:- Review techniques for milk expression (breast pumping). - Provide pumping equipment/supplies,  instructions, and assistance until it is safe to breastfeed infant.  Outcome: Progressing  Goal: Experiences normal breast weaning course  Description: INTERVENTIONS:- Assess for and manage engorgement.- Instruct on breast care.- Provide comfort measures.  Outcome: Progressing  Goal: Appropriate maternal -  bonding  Description: INTERVENTIONS:- Assess caregiver- interactions.- Assess caregiver's emotional status and coping mechanisms.- Encourage caregiver to participate in  daily care.- Assess support systems available to mother/family.- Provide /case management support as needed.  Outcome: Progressing     VSS, afebrile. Pt with intermittent pain relieved with Toradol and Tylenol. Lungs clear. BS hypoactive. No BM yet. Voiding freely. Per pt not passing gas. Incision with ABD dressing dry and intact. Fundus is firm, U/1, bleeding is scant. Plan of care reviewed with pt. Questions and concerns answered. Will continue with plan of care.

## 2025-04-27 NOTE — LACTATION NOTE
04/27/25 1130   Evaluation Type   Evaluation Type Inpatient   Problems identified   Problems identified Knowledge deficit   Maternal history   Maternal history Caesarean section   Breastfeeding goal   Breastfeeding goal To maintain breast milk feeding per patient goal   Maternal Assessment   Bilateral Breasts Soft;Symmetrical;Wide spaced   Bilateral Nipples Colostrum difficult to express;Everted   Prior breastfeeding experience (comment below) Primip   Breastfeeding Assistance Breastfeeding assistance provided with permission;Breast exam provided with permission;Hand expression provided with permission   Pain assessment   Location/Comment Denies   Treatment of Sore Nipples Deeper latch techniques;Expressed breast milk;Lanolin   Guidelines for use of:   Equipment Lanolin   Breast pump type Hand Pump   Current use of pump: Occasional   Suggested use of pump Pump if infant is not latching to breast;Pump each time a supplement is offered   Other (comment) LC assisted with latch to R breast.  Able to obtain/sustain a deep latch.  Reviewed deep latch techniques, breast compression, hand expression, waking techniques, supplementation guidelines, skin to skin benefits.  Encouraged to reach out with questions.

## 2025-04-27 NOTE — CM/SW NOTE
SDOH resources were previously provided to Haritha.  CM added these additional resources to her AVS.    Visit WhatsApp for discounted prescription drug coupons or App[employer]iomart for $4 prescriptions https://www.[email or web address]-dollar-prescriptions/6654718    Assistance with utilities  Low Income Home Energy Assistance Program  9-435-261-941    [employer] Program  [phone]    Housing Assistance  Transitional Housing by [facility]  [phone] ext [phone]    Financial Assistance &  by the [employer]  [phone], ext [phone]    Community Resources by [employer] [place]  Helps with baby supplies as well  [phone]

## 2025-04-27 NOTE — PROGRESS NOTES
POD 2    Pt doing well.  No c/o.      Alert and oriented nad   Abd soft nontender fundus firm  Ext nt    A./P POD 2    pt doing well.  No c/o.   Cbc 7.8   rx Iron infusion entered.

## 2025-04-27 NOTE — DISCHARGE INSTRUCTIONS
OTC Motrin 600mg every 6 hours as needed for pain alternating with extra strength tylenol every 6 hours   - Call if bleeding soaking a pad in <1 hr or fever >100.6 degrees or pain not resolved with ice or OTC Motrin. Follow up appointment in 2-3 weeks.     Remove glue with some Vaseline 5 days after surgery. Then apply Vaseline or similar product daily after shower until post partum appointment.     Visit [employer] for discounted prescription drug coupons or [employer] for $4 prescriptions https://www.[employer]/cp/4-dollar-prescriptions/4402188    Assistance with utilities  Low Income Home Energy Assistance Program  6-658-087-941    [employer] Program  [phone]    Housing Assistance  Transitional Housing by [facility]  [phone] ext [phone]    Financial Assistance &  by the [employer]  [phone], ext [phone]    Community Resources by [employer] [place]  Helps with baby supplies as well  [phone]

## 2025-04-27 NOTE — PLAN OF CARE
Problem: POSTPARTUM  Goal: Long Term Goal:Experiences normal postpartum course  Description: INTERVENTIONS:- Assess and monitor vital signs and lab values.- Assess fundus and lochia.- Provide ice/sitz baths for perineum discomfort.- Monitor healing of incision/episiotomy/laceration, and assess for signs and symptoms of infection and hematoma.- Assess bladder function and monitor for bladder distention.- Provide/instruct/assist with pericare as needed.- Provide VTE prophylaxis as needed.- Monitor bowel function.- Encourage ambulation and provide assistance as needed.- Assess and monitor emotional status and provide social service/psych resources as needed.- Utilize standard precautions and use personal protective equipment as indicated. Ensure aseptic care of all intravenous lines and invasive tubes/drains.- Obtain immunization and exposure to communicable diseases history.  Outcome: Progressing     Problem: POSTPARTUM  Goal: Optimize infant feeding at the breast  Description: INTERVENTIONS:- Initiate breast feeding within first hour after birth. - Monitor effectiveness of current breast feeding efforts.- Assess support systems available to mother/family.- Identify cultural beliefs/practices regarding lactation, letdown techniques, maternal food preferences.- Assess mother's knowledge and previous experience with breast feeding.- Provide information as needed about early infant feeding cues (e.g., rooting, lip smacking, sucking fingers/hand) versus late cue of crying.- Discuss/demonstrate breast feeding aids (e.g., infant sling, nursing footstool/pillows, and breast pumps).- Encourage mother/other family members to express feelings/concerns, and actively listen.- Educate father/SO about benefits of breast feeding and how to manage common lactation challenges.- Recommend avoidance of specific medications or substances incompatible with breast feeding.- Assess and monitor for signs of nipple pain/trauma.- Instruct  and provide assistance with proper latch.- Review techniques for milk expression (breast pumping) and storage of breast milk. Provide pumping equipment/supplies, instructions and assistance, as needed.- Encourage rooming-in and breast feeding on demand.- Encourage skin-to-skin contact.- Provide LC support as needed.- Assess for and manage engorgement.- Provide breast feeding education handouts and information on community breast feeding support.   Outcome: Progressing     Problem: POSTPARTUM  Goal: Establishment of adequate milk supply with medication/procedure interruptions  Description: INTERVENTIONS:- Review techniques for milk expression (breast pumping). - Provide pumping equipment/supplies, instructions, and assistance until it is safe to breastfeed infant.  Outcome: Progressing

## 2025-04-27 NOTE — LACTATION NOTE
This note was copied from a baby's chart.     04/27/25 1130   Evaluation Type   Evaluation Type Inpatient   Problems & Assessment   Problems Diagnosed or Identified Latch difficulty   Infant Assessment Hunger cues present;Skin color: pink or appropriate for ethnicity   Muscle tone Appropriate for GA   Feeding Assessment   Summary Current Feeding Breastfeeding with formula supplement   Last 24 hour feeding summary See I and O   Breastfeeding Assessment Assisted with breastfeeding w/mother's permission;Deep latch achieved and observed;Calm and ready to breastfeed;Coordinated suck/swallow;Sustained nutrititive latch w/audible swallows;Tolerated feeding well   Breastfeeding lasted # of minutes 10   Breastfeeding Positions cross cradle;right breast   Latch 2   Audible Sucks/Swallows 2   Type of Nipple 2   Comfort (Breast/Nipple) 2   Hold (Positioning) 1   LATCH Score 9   Other (comment) LC assisted with latch to R breast. Able to obtain/sustain a deep latch. Reviewed deep latch techniques, breast compression, hand expression, waking techniques, supplementation guidelines, skin to skin benefits. Encouraged to reach out with questions.

## 2025-04-28 VITALS
SYSTOLIC BLOOD PRESSURE: 116 MMHG | OXYGEN SATURATION: 97 % | TEMPERATURE: 98 F | HEART RATE: 71 BPM | RESPIRATION RATE: 17 BRPM | DIASTOLIC BLOOD PRESSURE: 72 MMHG

## 2025-04-28 RX ORDER — GABAPENTIN 300 MG/1
300 CAPSULE ORAL EVERY 8 HOURS PRN
Qty: 20 CAPSULE | Refills: 0 | Status: SHIPPED | OUTPATIENT
Start: 2025-04-28 | End: 2025-05-05

## 2025-04-28 RX ORDER — OXYCODONE HYDROCHLORIDE 5 MG/1
5 TABLET ORAL EVERY 6 HOURS PRN
Qty: 15 TABLET | Refills: 0 | Status: SHIPPED | OUTPATIENT
Start: 2025-04-28 | End: 2025-05-03

## 2025-04-28 RX ORDER — ACETAMINOPHEN 500 MG
1000 TABLET ORAL EVERY 6 HOURS PRN
Qty: 30 TABLET | Refills: 0 | Status: SHIPPED | COMMUNITY
Start: 2025-04-28

## 2025-04-28 RX ORDER — IBUPROFEN 600 MG/1
600 TABLET, FILM COATED ORAL EVERY 6 HOURS PRN
Qty: 30 TABLET | Refills: 0 | Status: SHIPPED | COMMUNITY
Start: 2025-04-28 | End: 2025-05-06

## 2025-04-28 NOTE — OPERATIVE REPORT
[facility]    PATIENT'S NAME: VICTORINO JOHNSON   ATTENDING PHYSICIAN: Wesley Jenkins MD   OPERATING PHYSICIAN: Wesley Jenkins MD   PATIENT ACCOUNT#:   [id number]    LOCATION:  [address] [place] [address] [facility]  MEDICAL RECORD #:   [id number]       YOB: 2005  ADMISSION DATE:       2025      OPERATION DATE:  2025    OPERATIVE REPORT    PREOPERATIVE DIAGNOSIS:  Arrest of dilation and fetal intolerance to labor.  POSTOPERATIVE DIAGNOSIS:  Arrest of dilation and fetal intolerance to labor.  PROCEDURE:  Primary low transverse  section.    ASSISTANT SURGEON:  Guillaume Guna MD.    ANESTHESIA:  Epidural.    COMPLICATIONS:  None.    FINDINGS:  Delivered a viable male infant weighing 3010 g.  Apgars 8 at 1 minute, 9 at 5 minutes.  Infant delivered noted to be in occiput posterior with caput noted.    CONDITION:  Patient tolerated the procedure well and was taken to the PAR in good condition.    INDICATIONS:  The patient is a 19-year-old female, noted to have fetal intolerance of labor with repetitive variable decelerations and in addition, patient was noted to have arrest of dilation at 9 cm in excess of 2 to 3 hours.  Patient was counseled on options of continued labor versus primary low transverse  section and the patient requested to proceed with primary low transverse  section with the risks, benefits, and alternatives counseled including the risks of bleeding; infection; damage to internal organs (including bowel, bladder, uterus, ovaries, tubes, cervix, vagina, ureters, blood vessels, among other organs); risk of anesthesia; risk of thromboembolic disease such as DVT, PE, MI, or stroke; risk of hemorrhage and blood transfusion; among other risks.  The patient voiced understanding of all the above, and all questions were answered.    OPERATIVE TECHNIQUE:  The patient was taken to the operating room and after epidural anesthesia was noted to be in excellent working  order, the patient was prepped and draped in the usual sterile fashion.  A Alicia catheter was placed in the bladder for drainage.  Sequential compression devices were begun prior to procedure, were operative throughout the procedure and will be operative postoperative as well for DVT prophylaxis.  Ancef 2 g, as well as 500 mg Zithromax was given preoperatively for infection prophylaxis.  Testing revealed excellent epidural anesthesia.  The patient was prepped and draped in the usual sterile fashion.  A knife was used to perform a Pfannenstiel skin incision which was carried down through the subcutaneous tissue to the fascia.  The fascia was incised in the middle, and the incision extended laterally.  The fascia was raised off the rectus muscles.  The rectus muscles were  in the midline.  The peritoneum was entered.  The peritoneal incision was extended and bladder blade was placed.  A bladder flap created.  Bladder blade was replaced.  A knife was used to perform a low transverse uterine incision which was extended laterally using the digital method.  Infant was then delivered in a cephalic presentation without difficulty.  Bulb suctioned on maternal abdomen.  One minute delayed cord clamping per Neonatology was performed, after which the cord was clamped and cut.  The infant was taken to warmer in good condition.  Cord blood and cord gases were obtained, after which, the placenta was delivered intact and sent to Pathology.  Uterus, ovaries, and tubes exteriorized, grossly normal.  After it was assured all clots and membranes were removed from the intrauterine cavity, uterine incision was closed with 0 Vicryl suture with a running interlocking stitch, followed by 0 Vicryl suture running imbricating stitch.  Excellent hemostasis was noted.  Irrigation was performed.  Excellent hemostasis was noted.  Uterus, ovaries, and tubes were replaced intraperitoneally.  Irrigation was performed.  Excellent hemostasis  was noted all counts were correct. The rectus muscles were hemostatic.  The fascia was closed with a #1 Vicryl x2 and excellent closure of the fascia was noted.  Subcutaneous tissue was irrigated.  Excellent hemostasis was noted.  A 3-0 plain gut suture was used to reapproximate the subcutaneous space.  Excellent hemostasis was noted. The skin was closed with 3-0 Vicryl Rapide suture in a subcuticular stitch, and followed by Dermabond and a pressure dressing placed.  All counts were correct.  Excellent hemostasis was noted.  The patient tolerated the procedure well, was taken to the PAR in good condition.    Dictated By Wesley Jenkins MD  d: 04/26/2025 19:23:26  t: 04/26/2025 20:58:51  [facility] [id number]  AAS/

## 2025-04-28 NOTE — PLAN OF CARE
Problem: Patient Centered Care  Goal: Patient preferences are identified and integrated in the patient's plan of care  Description: Interventions:- What would you like us to know as we care for you? We're having a boy.   - Provide timely, complete, and accurate information to patient/family- Incorporate patient and family knowledge, values, beliefs, and cultural backgrounds into the planning and delivery of care- Encourage patient/family to participate in care and decision-making at the level they choose- Honor patient and family perspectives and choices  Outcome: Completed     Problem: Patient/Family Goals  Goal: Patient/Family Long Term Goal  Description: Patient's Long Term Goal: Healthy postpartum progression    Interventions:  - Lactation support as needed  - Timely assessments  - Pain control per MAR, discussed with patient  - Bleeding control  - Updated per patient and family preferences   - Provide disease specific education and reinforcement throughout hospitalization     Outcome: Completed  Goal: Patient/Family Short Term Goal  Description: Patient's Short Term Goal: Home with healthy baby    Interventions:   - see above  - Lactation consult    Outcome: Completed     Problem: POSTPARTUM  Goal: Long Term Goal:Experiences normal postpartum course  Description: INTERVENTIONS:- Assess and monitor vital signs and lab values.- Assess fundus and lochia.- Provide ice/sitz baths for perineum discomfort.- Monitor healing of incision/episiotomy/laceration, and assess for signs and symptoms of infection and hematoma.- Assess bladder function and monitor for bladder distention.- Provide/instruct/assist with pericare as needed.- Provide VTE prophylaxis as needed.- Monitor bowel function.- Encourage ambulation and provide assistance as needed.- Assess and monitor emotional status and provide social service/psych resources as needed.- Utilize standard precautions and use personal protective equipment as indicated.  Ensure aseptic care of all intravenous lines and invasive tubes/drains.- Obtain immunization and exposure to communicable diseases history.  Outcome: Completed     Problem: POSTPARTUM  Goal: Optimize infant feeding at the breast  Description: INTERVENTIONS:- Initiate breast feeding within first hour after birth. - Monitor effectiveness of current breast feeding efforts.- Assess support systems available to mother/family.- Identify cultural beliefs/practices regarding lactation, letdown techniques, maternal food preferences.- Assess mother's knowledge and previous experience with breast feeding.- Provide information as needed about early infant feeding cues (e.g., rooting, lip smacking, sucking fingers/hand) versus late cue of crying.- Discuss/demonstrate breast feeding aids (e.g., infant sling, nursing footstool/pillows, and breast pumps).- Encourage mother/other family members to express feelings/concerns, and actively listen.- Educate father/SO about benefits of breast feeding and how to manage common lactation challenges.- Recommend avoidance of specific medications or substances incompatible with breast feeding.- Assess and monitor for signs of nipple pain/trauma.- Instruct and provide assistance with proper latch.- Review techniques for milk expression (breast pumping) and storage of breast milk. Provide pumping equipment/supplies, instructions and assistance, as needed.- Encourage rooming-in and breast feeding on demand.- Encourage skin-to-skin contact.- Provide LC support as needed.- Assess for and manage engorgement.- Provide breast feeding education handouts and information on community breast feeding support.   Outcome: Completed     Problem: POSTPARTUM  Goal: Establishment of adequate milk supply with medication/procedure interruptions  Description: INTERVENTIONS:- Review techniques for milk expression (breast pumping). - Provide pumping equipment/supplies, instructions, and assistance until it is safe to  breastfeed infant.  Outcome: Completed     Problem: POSTPARTUM  Goal: Appropriate maternal -  bonding  Description: INTERVENTIONS:- Assess caregiver- interactions.- Assess caregiver's emotional status and coping mechanisms.- Encourage caregiver to participate in  daily care.- Assess support systems available to mother/family.- Provide /case management support as needed.  Outcome: Completed

## 2025-04-28 NOTE — LACTATION NOTE
LACTATION NOTE - MOTHER      Evaluation Type: Inpatient    Problems identified  Problems identified: Knowledge deficit, Unable to acheive sustained latch, Milk supply not WNL  Milk supply not WNL: Reduced (potential)    Maternal history  Maternal history: Caesarean section    Breastfeeding goal  Breastfeeding goal: To maintain breast milk feeding per patient goal    Maternal Assessment  Prior breastfeeding experience (comment below): Primip  Breastfeeding Assistance: LC assistance declined at this time (infant just fed)    Pain assessment  Location/Comment: Denies    Guidelines for use of:  Breast pump type: Hand Pump, Other  Current use of pump:: not pumping  Suggested use of pump: Pump if infant is not latching to breast, Pump each time a supplement is offered, Pump 8-12X/24hr  Other (comment): Dyad seen at 58 hours. Mom endorses she is not really pumping and infant is primarily bottle feeding. Mom would like to breastfeed. States that infant is not latching to the breast anymore and says she \"doesn't have any milk\". Mom had pumped previusly a couple times with the hand pump but didn't see any milk. Discussed supply and demand and the importance of pumping to \"put in the order\". Encouraged to start pumping with double electric pump (has at home) 8-12x a day, attempt the breast as often as she would like and follow up with a full supplement and pumping afterwards. Educated on paced bottle feeding and an appt made for 5/13 @ 2:00 PM.

## 2025-04-28 NOTE — DISCHARGE SUMMARY
DISCHARGE SUMMARY      Date of Admission:  2025  Date of Discharge: 2025          Admission Diagnoses:  pregnancy  Pregnant (HCC)  Pregnancy ([facility])  Discharge Diagnoses:  Principal Problem:     delivery delivered ([facility])  Active Problems:    Maternal varicella, non-immune (HCC)    Pregnant (HCC)    Pregnancy ([facility])    Arrest of dilation, delivered, current hospitalization ([facility])             Operations/Procedures this visit:   Section       Hospital Course:    The patient presented to L&D and underwent c/s - see op note for details.  She delivered a viable infant. Postoperatively the patient did very well.  Her hemoglobin stabilized.  By POD # 3 the patient was feeling well, tolerating a regular diet, passing flatus, and ambulating and voiding without difficulty.  Her vitals had remained stable & she was afebrile.  Her incision was clean, dry, & intact without evidence of infection or erythema.  The patient desired discharge on POD # 3.       Condition at Discharge:  /85 (BP Location: Right arm)   Pulse 81   Temp 97.9 °F (36.6 °C) (Oral)   Resp 17   LMP 2024 (Approximate)   SpO2 97%   Breastfeeding Yes     Physical Exam   Gen - NAD  Abdomen -soft, ND, NT  Fundus - firm, NT  Incision - C/D/I  Extremeties - NT    Discharge Disposition:  Home or Self Care     Discharge Medications:     Discharge Medications        START taking these medications        Instructions Prescription details   acetaminophen 500 MG Tabs  Commonly known as: Tylenol Extra Strength      Take 2 tablets (1,000 mg total) by mouth every 6 (six) hours as needed for Pain.   Quantity: 30 tablet  Refills: 0     gabapentin 300 MG Caps  Commonly known as: Neurontin      Take 1 capsule (300 mg total) by mouth every 8 (eight) hours as needed (For breakthrough moderate pain).   Stop taking on: May 5, 2025  Quantity: 20 capsule  Refills: 0     ibuprofen 600 MG Tabs  Commonly known as: Motrin      Take 1 tablet (600  mg total) by mouth every 6 (six) hours as needed for Pain.   Stop taking on: May 6, 2025  Quantity: 30 tablet  Refills: 0     oxyCODONE 5 MG Tabs      Take 1 tablet (5 mg total) by mouth every 6 (six) hours as needed for Pain.   Stop taking on: May 3, 2025  Quantity: 15 tablet  Refills: 0            CONTINUE taking these medications        Instructions Prescription details   ascorbic acid 250 MG Tabs  Commonly known as: Vitamin C      Take 1 tablet (250 mg total) by mouth every other day.   Quantity: 90 tablet  Refills: 2     calcium carbonate 500 MG Chew  Commonly known as: Tums      Chew 1 tablet (500 mg total) by mouth daily.   Quantity: 90 tablet  Refills: 0     chlorproMAZINE 50 MG Tabs  Commonly known as: Thorazine      TAKE 1/2 TABLET(25 MG) BY MOUTH THREE TIMES DAILY   Quantity: 30 tablet  Refills: 0     cholecalciferol 50 MCG (2000 UT) Tabs  Commonly known as: Vitamin D3      Take 1 tablet (2,000 Units total) by mouth daily.   Refills: 0     diphenhydrAMINE HCl 50 MG Tabs  Commonly known as: BENADRYL      Take 1 tablet (50 mg total) by mouth every 4 to 6 hours.   Quantity: 90 tablet  Refills: 1     famotidine 20 MG Tabs  Commonly known as: Pepcid      Take 1 tablet (20 mg total) by mouth 2 (two) times daily.   Quantity: 60 tablet  Refills: 5     Ferrous Sulfate 325 (65 Fe) MG Tabs      Take 1 tablet (325 mg total) by mouth daily with breakfast.   Quantity: 90 tablet  Refills: 2     metoclopramide 10 MG Tabs  Commonly known as: Reglan      Take 1 tablet (10 mg total) by mouth every 6 (six) hours as needed.   Quantity: 20 tablet  Refills: 0     ondansetron 4 mg tablet  Commonly known as: Zofran      Take 1 tablet (4 mg total) by mouth every 8 (eight) hours as needed for Nausea.   Quantity: 30 tablet  Refills: 0     ondansetron 4 MG Tbdp  Commonly known as: Zofran-ODT      Take 1 tablet (4 mg total) by mouth every 4 (four) hours as needed for Nausea.   Quantity: 15 tablet  Refills: 0     Prenatal 27-0.8 MG  Tabs      Take 1 tablet by mouth daily.   Refills: 0     Prenatal/Folic Acid+DHA 27-0.8-200 MG Caps      Take 1 capsule by mouth daily.   Quantity: 90 capsule  Refills: 2            STOP taking these medications      Aspirin 81 MG Caps                  Where to Get Your Medications        These medications were sent to [id number] - [place], [place] - [address] AT [facility], [phone], [phone]  [address], [place] [address]      Phone: [phone]   gabapentin 300 MG Caps  oxyCODONE 5 MG Tabs       Information about where to get these medications is not yet available    Ask your nurse or doctor about these medications  acetaminophen 500 MG Tabs  ibuprofen 600 MG Tabs              Plan of Care:   Varicella vaccine before discharge  Diet: Regular As Tolerated  Activity:  Pelvic rest, No heavy lifting, No driving    Follow Up:  Postpartum visit in 2-3 weeks           Syl NAVARRO-S      Patient seen and examined, Agree with assessment and plan of care documented by PA student.     Mary Benoit MD

## 2025-04-30 NOTE — PAYOR COMM NOTE
--------------  DISCHARGE REVIEW    Payor: [facility]  Subscriber #:  [id number]  Authorization Number: [id number]    Admit date: 25  Admit time:   3:03 PM  Discharge Date: 2025  1:22 PM     Admitting Physician: Mirtha Holland MD  Attending Physician:  No att. providers found  Primary Care Physician: Matthew Coombs MD          Discharge Summary Notes        Discharge Summary signed by Mary Benoit MD at 2025  9:14 AM       Author: Mary Benoit MD Specialty: OBSTETRICS & GYNECOLOGY Author Type: Physician    Filed: 2025  9:14 AM Date of Service: 2025  9:08 AM Status: Signed    : Mary Benoit MD (Physician)    Related Notes: Original Note by Syl Maher (PA Student) filed at 2025  9:13 AM               DISCHARGE SUMMARY      Date of Admission:  2025  Date of Discharge: 2025          Admission Diagnoses:  pregnancy  Pregnant (HCC)  Pregnancy (HCC)  Discharge Diagnoses:  Principal Problem:     delivery delivered (HCC)  Active Problems:    Maternal varicella, non-immune (HCC)    Pregnant (HCC)    Pregnancy (HCC)    Arrest of dilation, delivered, current hospitalization ([facility])             Operations/Procedures this visit:   Section       Hospital Course:    The patient presented to L&D and underwent c/s - see op note for details.  She delivered a viable infant. Postoperatively the patient did very well.  Her hemoglobin stabilized.  By POD # 3 the patient was feeling well, tolerating a regular diet, passing flatus, and ambulating and voiding without difficulty.  Her vitals had remained stable & she was afebrile.  Her incision was clean, dry, & intact without evidence of infection or erythema.  The patient desired discharge on POD # 3.       Condition at Discharge:  /85 (BP Location: Right arm)   Pulse 81   Temp 97.9 °F (36.6 °C) (Oral)   Resp 17   LMP 2024 (Approximate)   SpO2 97%    Breastfeeding Yes     Physical Exam   Gen - NAD  Abdomen -soft, ND, NT  Fundus - firm, NT  Incision - C/D/I  Extremeties - NT    Discharge Disposition:  Home or Self Care     Discharge Medications:     Discharge Medications        START taking these medications        Instructions Prescription details   acetaminophen 500 MG Tabs  Commonly known as: Tylenol Extra Strength      Take 2 tablets (1,000 mg total) by mouth every 6 (six) hours as needed for Pain.   Quantity: 30 tablet  Refills: 0     gabapentin 300 MG Caps  Commonly known as: Neurontin      Take 1 capsule (300 mg total) by mouth every 8 (eight) hours as needed (For breakthrough moderate pain).   Stop taking on: May 5, 2025  Quantity: 20 capsule  Refills: 0     ibuprofen 600 MG Tabs  Commonly known as: Motrin      Take 1 tablet (600 mg total) by mouth every 6 (six) hours as needed for Pain.   Stop taking on: May 6, 2025  Quantity: 30 tablet  Refills: 0     oxyCODONE 5 MG Tabs      Take 1 tablet (5 mg total) by mouth every 6 (six) hours as needed for Pain.   Stop taking on: May 3, 2025  Quantity: 15 tablet  Refills: 0            CONTINUE taking these medications        Instructions Prescription details   ascorbic acid 250 MG Tabs  Commonly known as: Vitamin C      Take 1 tablet (250 mg total) by mouth every other day.   Quantity: 90 tablet  Refills: 2     calcium carbonate 500 MG Chew  Commonly known as: Tums      Chew 1 tablet (500 mg total) by mouth daily.   Quantity: 90 tablet  Refills: 0     chlorproMAZINE 50 MG Tabs  Commonly known as: Thorazine      TAKE 1/2 TABLET(25 MG) BY MOUTH THREE TIMES DAILY   Quantity: 30 tablet  Refills: 0     cholecalciferol 50 MCG (2000 UT) Tabs  Commonly known as: Vitamin D3      Take 1 tablet (2,000 Units total) by mouth daily.   Refills: 0     diphenhydrAMINE HCl 50 MG Tabs  Commonly known as: BENADRYL      Take 1 tablet (50 mg total) by mouth every 4 to 6 hours.   Quantity: 90 tablet  Refills: 1     famotidine 20 MG  Tabs  Commonly known as: Pepcid      Take 1 tablet (20 mg total) by mouth 2 (two) times daily.   Quantity: 60 tablet  Refills: 5     Ferrous Sulfate 325 (65 Fe) MG Tabs      Take 1 tablet (325 mg total) by mouth daily with breakfast.   Quantity: 90 tablet  Refills: 2     metoclopramide 10 MG Tabs  Commonly known as: Reglan      Take 1 tablet (10 mg total) by mouth every 6 (six) hours as needed.   Quantity: 20 tablet  Refills: 0     ondansetron 4 mg tablet  Commonly known as: Zofran      Take 1 tablet (4 mg total) by mouth every 8 (eight) hours as needed for Nausea.   Quantity: 30 tablet  Refills: 0     ondansetron 4 MG Tbdp  Commonly known as: Zofran-ODT      Take 1 tablet (4 mg total) by mouth every 4 (four) hours as needed for Nausea.   Quantity: 15 tablet  Refills: 0     Prenatal 27-0.8 MG Tabs      Take 1 tablet by mouth daily.   Refills: 0     Prenatal/Folic Acid+DHA 27-0.8-200 MG Caps      Take 1 capsule by mouth daily.   Quantity: 90 capsule  Refills: 2            STOP taking these medications      Aspirin 81 MG Caps                  Where to Get Your Medications        These medications were sent to [id number] - [place], [place] - [address] AT [facility], [phone], [phone]  [address] [facility] [address]      Phone: [phone]   gabapentin 300 MG Caps  oxyCODONE 5 MG Tabs       Information about where to get these medications is not yet available    Ask your nurse or doctor about these medications  acetaminophen 500 MG Tabs  ibuprofen 600 MG Tabs              Plan of Care:   Varicella vaccine before discharge  Diet: Regular As Tolerated  Activity:  Pelvic rest, No heavy lifting, No driving    Follow Up:  Postpartum visit in 2-3 weeks           Syl ROSARIOS      Patient seen and examined, Agree with assessment and plan of care documented by PA student.     Mary Benoit MD      Electronically signed by Mary Benoit MD on 4/28/2025  9:14  AM         REVIEWER COMMENTS

## 2025-05-14 ENCOUNTER — TELEPHONE (OUTPATIENT)
Dept: OBGYN UNIT | Facility: HOSPITAL | Age: 20
End: 2025-05-14

## 2025-05-17 ENCOUNTER — APPOINTMENT (OUTPATIENT)
Dept: ULTRASOUND IMAGING | Facility: HOSPITAL | Age: 20
End: 2025-05-17
Attending: STUDENT IN AN ORGANIZED HEALTH CARE EDUCATION/TRAINING PROGRAM
Payer: MEDICAID

## 2025-05-17 ENCOUNTER — HOSPITAL ENCOUNTER (EMERGENCY)
Facility: HOSPITAL | Age: 20
Discharge: HOME | End: 2025-05-17
Attending: STUDENT IN AN ORGANIZED HEALTH CARE EDUCATION/TRAINING PROGRAM
Payer: MEDICAID

## 2025-05-17 VITALS
RESPIRATION RATE: 20 BRPM | TEMPERATURE: 97 F | HEART RATE: 81 BPM | HEIGHT: 64 IN | WEIGHT: 200 LBS | SYSTOLIC BLOOD PRESSURE: 110 MMHG | OXYGEN SATURATION: 98 % | BODY MASS INDEX: 34.15 KG/M2 | DIASTOLIC BLOOD PRESSURE: 75 MMHG

## 2025-05-17 DIAGNOSIS — R10.13 EPIGASTRIC PAIN: ICD-10-CM

## 2025-05-17 DIAGNOSIS — K80.20 CALCULUS OF GALLBLADDER WITHOUT CHOLECYSTITIS WITHOUT OBSTRUCTION: Primary | ICD-10-CM

## 2025-05-17 LAB
ALBUMIN SERPL-MCNC: 4.3 G/DL (ref 3.2–4.8)
ALP LIVER SERPL-CCNC: 132 U/L (ref 52–144)
ALT SERPL-CCNC: 27 U/L (ref 10–49)
ANION GAP SERPL CALC-SCNC: 7 MMOL/L (ref 0–18)
AST SERPL-CCNC: 47 U/L (ref ?–34)
BASOPHILS # BLD AUTO: 0.05 X10(3) UL (ref 0–0.2)
BASOPHILS NFR BLD AUTO: 0.4 %
BILIRUB DIRECT SERPL-MCNC: 0.2 MG/DL (ref ?–0.3)
BILIRUB SERPL-MCNC: 0.5 MG/DL (ref 0.3–1.2)
BUN BLD-MCNC: 9 MG/DL (ref 9–23)
BUN/CREAT SERPL: 11.7 (ref 10–20)
CALCIUM BLD-MCNC: 8.8 MG/DL (ref 8.7–10.4)
CHLORIDE SERPL-SCNC: 104 MMOL/L (ref 98–112)
CO2 SERPL-SCNC: 29 MMOL/L (ref 21–32)
CREAT BLD-MCNC: 0.77 MG/DL (ref 0.55–1.02)
DEPRECATED RDW RBC AUTO: 44.2 FL (ref 35.1–46.3)
EGFRCR SERPLBLD CKD-EPI 2021: 114 ML/MIN/1.73M2 (ref 60–?)
EOSINOPHIL # BLD AUTO: 0.94 X10(3) UL (ref 0–0.7)
EOSINOPHIL NFR BLD AUTO: 6.6 %
ERYTHROCYTE [DISTWIDTH] IN BLOOD BY AUTOMATED COUNT: 13.9 % (ref 11–15)
GLUCOSE BLD-MCNC: 91 MG/DL (ref 70–99)
HCT VFR BLD AUTO: 33.1 % (ref 35–48)
HGB BLD-MCNC: 10.7 G/DL (ref 12–16)
IMM GRANULOCYTES # BLD AUTO: 0.05 X10(3) UL (ref 0–1)
IMM GRANULOCYTES NFR BLD: 0.4 %
LIPASE SERPL-CCNC: 31 U/L (ref 12–53)
LYMPHOCYTES # BLD AUTO: 3.28 X10(3) UL (ref 1.5–5)
LYMPHOCYTES NFR BLD AUTO: 23.1 %
MCH RBC QN AUTO: 28.2 PG (ref 26–34)
MCHC RBC AUTO-ENTMCNC: 32.3 G/DL (ref 31–37)
MCV RBC AUTO: 87.1 FL (ref 80–100)
MONOCYTES # BLD AUTO: 0.71 X10(3) UL (ref 0.1–1)
MONOCYTES NFR BLD AUTO: 5 %
NEUTROPHILS # BLD AUTO: 9.15 X10 (3) UL (ref 1.5–7.7)
NEUTROPHILS # BLD AUTO: 9.15 X10(3) UL (ref 1.5–7.7)
NEUTROPHILS NFR BLD AUTO: 64.5 %
OSMOLALITY SERPL CALC.SUM OF ELEC: 288 MOSM/KG (ref 275–295)
PLATELET # BLD AUTO: 483 10(3)UL (ref 150–450)
POTASSIUM SERPL-SCNC: 3.7 MMOL/L (ref 3.5–5.1)
PROT SERPL-MCNC: 7 G/DL (ref 5.7–8.2)
RBC # BLD AUTO: 3.8 X10(6)UL (ref 3.8–5.3)
SODIUM SERPL-SCNC: 140 MMOL/L (ref 136–145)
WBC # BLD AUTO: 14.2 X10(3) UL (ref 4–11)

## 2025-05-17 PROCEDURE — 83690 ASSAY OF LIPASE: CPT | Performed by: STUDENT IN AN ORGANIZED HEALTH CARE EDUCATION/TRAINING PROGRAM

## 2025-05-17 PROCEDURE — 76705 ECHO EXAM OF ABDOMEN: CPT | Performed by: STUDENT IN AN ORGANIZED HEALTH CARE EDUCATION/TRAINING PROGRAM

## 2025-05-17 PROCEDURE — 85025 COMPLETE CBC W/AUTO DIFF WBC: CPT | Performed by: STUDENT IN AN ORGANIZED HEALTH CARE EDUCATION/TRAINING PROGRAM

## 2025-05-17 PROCEDURE — 99285 EMERGENCY DEPT VISIT HI MDM: CPT

## 2025-05-17 PROCEDURE — 80048 BASIC METABOLIC PNL TOTAL CA: CPT | Performed by: STUDENT IN AN ORGANIZED HEALTH CARE EDUCATION/TRAINING PROGRAM

## 2025-05-17 PROCEDURE — 80076 HEPATIC FUNCTION PANEL: CPT | Performed by: STUDENT IN AN ORGANIZED HEALTH CARE EDUCATION/TRAINING PROGRAM

## 2025-05-17 PROCEDURE — 96374 THER/PROPH/DIAG INJ IV PUSH: CPT

## 2025-05-17 PROCEDURE — 99284 EMERGENCY DEPT VISIT MOD MDM: CPT

## 2025-05-17 RX ORDER — SUCRALFATE 1 G/1
1 TABLET ORAL
Qty: 45 TABLET | Refills: 0 | Status: SHIPPED | OUTPATIENT
Start: 2025-05-17

## 2025-05-17 RX ORDER — FAMOTIDINE 10 MG/ML
20 INJECTION, SOLUTION INTRAVENOUS ONCE
Status: COMPLETED | OUTPATIENT
Start: 2025-05-17 | End: 2025-05-17

## 2025-05-17 RX ORDER — MAGNESIUM HYDROXIDE/ALUMINUM HYDROXICE/SIMETHICONE 120; 1200; 1200 MG/30ML; MG/30ML; MG/30ML
30 SUSPENSION ORAL ONCE
Status: COMPLETED | OUTPATIENT
Start: 2025-05-17 | End: 2025-05-17

## 2025-05-17 RX ORDER — FAMOTIDINE 20 MG/1
20 TABLET, FILM COATED ORAL 2 TIMES DAILY PRN
Qty: 30 TABLET | Refills: 0 | Status: SHIPPED | OUTPATIENT
Start: 2025-05-17 | End: 2025-06-16

## 2025-05-18 NOTE — ED PROVIDER NOTES
Patient Seen in: [facility] Emergency Department      History   No chief complaint on file.    Stated Complaint: Abdominal Pain 3 weeks post     Subjective:   HPI  History of Present Illness            19-year-old patient is about 3 weeks post female presents evaluation of upper abdominal pain.  Onset of symptoms this afternoon around 4 PM.  States was laying down at the onset of symptoms.  Describes constant pain in upper abdomen.  Denies fevers chills nausea vomiting diarrhea urinary symptoms.  Patient about 3 wks post-procedure for  section.      Objective:     Past Medical History:    Anxiety    Decorative tattoo              Past Surgical History:   Procedure Laterality Date    Tonsillectomy                  Social History     Socioeconomic History    Marital status: Single   Tobacco Use    Smoking status: Never     Passive exposure: Never    Smokeless tobacco: Never   Vaping Use    Vaping status: Never Used   Substance and Sexual Activity    Alcohol use: Never    Drug use: Not Currently     Types: Cannabis    Sexual activity: Yes     Partners: Male     Social Drivers of Health     Food Insecurity: No Food Insecurity (2025)    NCSS - Food Insecurity     Worried About Running Out of Food in the Last Year: No     Ran Out of Food in the Last Year: No   Transportation Needs: No Transportation Needs (2025)    NCSS - Transportation     Lack of Transportation: No   Stress: No Stress Concern Present (2025)    Stress     Feeling of Stress : No   Housing Stability: At Risk (2025)    NCSS - Housing/Utilities     Has Housing: Yes     Worried About Losing Housing: No     Unable to Get Utilities: Yes                                Physical Exam     ED Triage Vitals [25 1812]   /75   Pulse 117   Resp 20   Temp 96.8 °F (36 °C)   Temp src    SpO2 97 %   O2 Device None (Room air)       Current Vitals:   Vital Signs  BP: 110/75  Pulse: 81  Resp: 20  Temp: 96.8 °F (36  °C)  MAP (mmHg): 85    Oxygen Therapy  SpO2: 98 %  O2 Device: None (Room air)          Physical Exam  Constitutional: awake, alert, no sig distress  HENT: mmm, no lesions,  Neck: normal range of motion, no tenderness, supple.  Eyes: PERRL, EOMI, conjunctiva normal, no discharge. Sclera anicteric.  Cardiovascular: rr no murmur  Respiratory: Normal breath sounds, no respiratory distress, no wheezing, no chest tenderness.  GI: Bowel sounds normal, Soft, TTP Epigastrium and RUQ -Whitman  -pfannenstiel incision healing well - no fluctuance, nontender   : No CVA tenderness.  Skin: Warm, dry, no erythema, no rash.  Musculoskeletal: Intact distal pulses, no edema, no tenderness, no cyanosis, no clubbing. Good range of motion in all major joints. No tenderness to palpation or major deformities noted. Back- No tenderness.  Neurologic: Alert & oriented x 3, normal motor function, normal sensory function, no focal deficits noted.  Psych: Calm, cooperative, nl affect              ED Course     Labs Reviewed   HEPATIC FUNCTION PANEL (7) - Abnormal; Notable for the following components:       Result Value    AST 47 (*)     All other components within normal limits   CBC WITH DIFFERENTIAL WITH PLATELET - Abnormal; Notable for the following components:    WBC 14.2 (*)     HGB 10.7 (*)     HCT 33.1 (*)     .0 (*)     Neutrophil Absolute Prelim 9.15 (*)     Neutrophil Absolute 9.15 (*)     Eosinophil Absolute 0.94 (*)     All other components within normal limits   BASIC METABOLIC PANEL (8) - Normal   LIPASE - Normal   RAINBOW DRAW LAVENDER   RAINBOW DRAW LIGHT GREEN   RAINBOW DRAW BLUE   RAINBOW DRAW GOLD          Results                                MDM      19F hx as above who presents with upper abd pain  On arrival vss, reassuring  Ddx: GERD/Gastritis, Pancreatitis Sx Cholelithiasis, Acute Cholecystitis  -do not suspect cardiac cause  Labs reviewed WBC 14.2.  Modest elevation AST 47.  Lipase normal.    Patient was given  Maalox and Pepcid.  This resulted in improvement of symptoms.  Patient was endorsed oncoming physician pending ultrasound prior to final disposition          MDM    Disposition and Plan     Clinical Impression:  1. Calculus of gallbladder without cholecystitis without obstruction    2. Epigastric pain         Disposition:  Discharge  5/17/2025 11:16 pm    Follow-up:  [facility] Emergency Department  [address]  [facility] [address]  [phone]  Follow up  As needed, If symptoms worsen    Matthew Coombs MD  [address] [place] [id number] [phone]    Follow up in 2 day(s)            Medications Prescribed:  Discharge Medication List as of 5/17/2025 11:21 PM        START taking these medications    Details   !! famotidine (PEPCID) 20 MG Oral Tab Take 1 tablet (20 mg total) by mouth 2 (two) times daily as needed for Heartburn., Normal, Disp-30 tablet, R-0      sucralfate 1 g Oral Tab Take 1 tablet (1 g total) by mouth 4 (four) times daily before meals and nightly., Normal, Disp-45 tablet, R-0       !! - Potential duplicate medications found. Please discuss with provider.                Supplementary Documentation:

## 2025-06-05 ENCOUNTER — TELEPHONE (OUTPATIENT)
Dept: OBGYN CLINIC | Facility: CLINIC | Age: 20
End: 2025-06-05

## 2025-06-05 ENCOUNTER — POSTPARTUM (OUTPATIENT)
Dept: OBGYN CLINIC | Facility: CLINIC | Age: 20
End: 2025-06-05

## 2025-06-05 VITALS
DIASTOLIC BLOOD PRESSURE: 68 MMHG | SYSTOLIC BLOOD PRESSURE: 101 MMHG | BODY MASS INDEX: 35.85 KG/M2 | HEIGHT: 64 IN | WEIGHT: 210 LBS

## 2025-06-05 NOTE — PROGRESS NOTES
HPI:     Haritha Cesar is a 19 year old female who presents for a pp visit.      Wt Readings from Last 6 Encounters:   06/05/25 210 lb (95.3 kg) (98%, Z= 2.08)*   05/17/25 200 lb (90.7 kg) (97%, Z= 1.94)*   04/21/25 215 lb (97.5 kg) (98%, Z= 2.15)*   04/14/25 214 lb (97.1 kg) (98%, Z= 2.14)*   04/07/25 210 lb (95.3 kg) (98%, Z= 2.08)*   03/31/25 208 lb (94.3 kg) (98%, Z= 2.06)*     * Growth percentiles are based on CDC (Girls, 2-20 Years) data.     Body mass index is 36.05 kg/m².    Cholesterol, Total (mg/dL)   Date Value   01/06/2018 134     HDL Cholesterol (mg/dL)   Date Value   01/06/2018 37     LDL Cholesterol (mg/dL)   Date Value   01/06/2018 74     AST (U/L)   Date Value   05/17/2025 47 (H)   10/17/2024 26   08/25/2024 15     ALT (U/L)   Date Value   05/17/2025 27   10/17/2024 33   08/25/2024 12        Current Medications[1]   Past Medical History[2]   Past Surgical History[3]   Family History[4]   Social History:   Short Social Hx on File[5]         REVIEW OF SYSTEMS:   GENERAL: feels well otherwise  SKIN: denies any unusual skin lesions  EYES:denies blurred vision or double vision  HEENT: denies nasal congestion, sinus pain or ST  LUNGS: denies shortness of breath with exertion  CARDIOVASCULAR: denies chest pain on exertion  GI: denies abdominal pain,denies heartburn  : denies dysuria, vaginal discharge or itching,periods regular   MUSCULOSKELETAL: denies back pain  NEURO: denies headaches  PSYCHE: denies depression or anxiety  HEMATOLOGIC: denies hx of anemia  ENDOCRINE: denies thyroid history  ALL/ASTHMA: denies hx of allergy or asthma    EXAM:   /68   Ht 5' 4\" (1.626 m)   Wt 210 lb (95.3 kg)   LMP 07/04/2024 (Approximate)   Breastfeeding Yes   BMI 36.05 kg/m²   Body mass index is 36.05 kg/m².   GENERAL: well developed, well nourished,in no apparent distress  SKIN: no rashes,no suspicious lesions  HEENT: atraumatic, normocephalic  EYES:normal in appearance  NECK: supple,no  adenopathy  CHEST: no chest tenderness  BREAST: def pt.   LUNGS: clear to auscultation  CARDIO: RRR without murmur  GI: good BS's,no masses, HSM or tenderness  pfannesteil incision healed well  :def pt    MUSCULOSKELETAL: back is not tender,FROM of the back  EXTREMITIES: no cyanosis, clubbing or edema  NEURO: Oriented times three      ASSESSMENT AND PLAN:   Haritha Cesar is a 19 year old female who presents for a  state   pt counseled on contraception, pt wants mirena iud 1 week.   . Self breast exam explained. Health maintenance. Body mass index is 36.05 kg/m²., recommended low fat diet and aerobic exercise 30 minutes three times weekly.  The patient indicates understanding of these issues and agrees to the plan.  The patient is asked to return for an annual visit.           [1]   Current Outpatient Medications   Medication Sig Dispense Refill    Prenatal 27-0.8 MG Oral Tab Take 1 tablet by mouth daily.      Prenatal MV-Min-Fe Fum-FA-DHA (PRENATAL/FOLIC ACID+DHA) 27-0.8-200 MG Oral Cap Take 1 capsule by mouth daily. 90 capsule 2    famotidine (PEPCID) 20 MG Oral Tab Take 1 tablet (20 mg total) by mouth 2 (two) times daily as needed for Heartburn. (Patient not taking: Reported on 6/5/2025) 30 tablet 0    sucralfate 1 g Oral Tab Take 1 tablet (1 g total) by mouth 4 (four) times daily before meals and nightly. (Patient not taking: Reported on 6/5/2025) 45 tablet 0    acetaminophen 500 MG Oral Tab Take 2 tablets (1,000 mg total) by mouth every 6 (six) hours as needed for Pain. (Patient not taking: Reported on 6/5/2025) 30 tablet 0    ondansetron (ZOFRAN) 4 mg tablet Take 1 tablet (4 mg total) by mouth every 8 (eight) hours as needed for Nausea. (Patient not taking: Reported on 6/5/2025) 30 tablet 0    cholecalciferol 50 MCG (2000 UT) Oral Tab Take 1 tablet (2,000 Units total) by mouth daily. (Patient not taking: Reported on 6/5/2025)      metoclopramide (REGLAN) 10 MG Oral Tab Take 1 tablet (10 mg total) by  mouth every 6 (six) hours as needed. (Patient not taking: Reported on 6/5/2025) 20 tablet 0    CHLORPROMAZINE 50 MG Oral Tab TAKE 1/2 TABLET(25 MG) BY MOUTH THREE TIMES DAILY (Patient not taking: Reported on 6/5/2025) 30 tablet 0    ascorbic acid 250 MG Oral Tab Take 1 tablet (250 mg total) by mouth every other day. (Patient not taking: Reported on 6/5/2025) 90 tablet 2    calcium carbonate (TUMS) 500 MG Oral Chew Tab Chew 1 tablet (500 mg total) by mouth daily. (Patient not taking: Reported on 6/5/2025) 90 tablet 0    famotidine 20 MG Oral Tab Take 1 tablet (20 mg total) by mouth 2 (two) times daily. (Patient not taking: Reported on 6/5/2025) 60 tablet 5    diphenhydrAMINE HCl 50 MG Oral Tab Take 1 tablet (50 mg total) by mouth every 4 to 6 hours. (Patient not taking: Reported on 6/5/2025) 90 tablet 1    Ferrous Sulfate 325 (65 Fe) MG Oral Tab Take 1 tablet (325 mg total) by mouth daily with breakfast. (Patient not taking: Reported on 6/5/2025) 90 tablet 2    ondansetron 4 MG Oral Tablet Dispersible Take 1 tablet (4 mg total) by mouth every 4 (four) hours as needed for Nausea. (Patient not taking: Reported on 6/5/2025) 15 tablet 0   [2]   Past Medical History:   Anxiety    Decorative tattoo   [3]   Past Surgical History:  Procedure Laterality Date    Tonsillectomy     [4]   Family History  Problem Relation Age of Onset    Diabetes Mother     Diabetes Maternal Grandmother    [5]   Social History  Socioeconomic History    Marital status: Single   Tobacco Use    Smoking status: Never     Passive exposure: Never    Smokeless tobacco: Never   Vaping Use    Vaping status: Never Used   Substance and Sexual Activity    Alcohol use: Never    Drug use: Not Currently     Types: Cannabis    Sexual activity: Yes     Partners: Male     Social Drivers of Health     Food Insecurity: Low Risk  (5/23/2025)    Received from [facility] Food Security     Within the past 12 months, the food you bought just didn't last and you  didn't have money to get more.: 3     Within the past 12 months, you worried that your food would run out before you got money to buy more.: 3   Transportation Needs: Not At Risk (5/23/2025)    Received from [facility] Transportation Needs     In the past 12 months, has lack of reliable transportation kept you from medical appointments, meetings, work or from getting things needed for daily living?: No   Stress: No Stress Concern Present (4/24/2025)    Stress     Feeling of Stress : No   Housing Stability: Not At Risk (5/23/2025)    Received from [facility] Housing     What is your living situation today?: I have a steady place to live     Think about the place you live. Do you have problems with any of the following?: None of the above   Recent Concern: Housing Stability - At Risk (4/24/2025)    NCSS - Housing/Utilities     Has Housing: Yes     Worried About Losing Housing: No     Unable to Get Utilities: Yes

## 2025-06-11 ENCOUNTER — TELEPHONE (OUTPATIENT)
Age: 20
End: 2025-06-11

## 2025-06-11 NOTE — TELEPHONE ENCOUNTER
The [facility] Navigation team has attempted to reach you in order to follow up on an order that was placed by Dr. Wesley Jenkins's office. Please give us a call back at [phone] to discuss care coordination and resources.

## 2025-06-19 ENCOUNTER — OFFICE VISIT (OUTPATIENT)
Dept: OBGYN CLINIC | Facility: CLINIC | Age: 20
End: 2025-06-19

## 2025-06-19 VITALS
DIASTOLIC BLOOD PRESSURE: 79 MMHG | WEIGHT: 210 LBS | BODY MASS INDEX: 35.85 KG/M2 | HEIGHT: 64 IN | SYSTOLIC BLOOD PRESSURE: 115 MMHG

## 2025-06-19 DIAGNOSIS — Z30.430 ENCOUNTER FOR IUD INSERTION: ICD-10-CM

## 2025-06-19 DIAGNOSIS — Z01.812 PRE-PROCEDURAL LABORATORY EXAMINATION: Primary | ICD-10-CM

## 2025-06-19 LAB
CONTROL LINE PRESENT WITH A CLEAR BACKGROUND (YES/NO): YES YES/NO
KIT LOT #: NORMAL NUMERIC
PREGNANCY TEST, URINE: NEGATIVE

## 2025-06-19 PROCEDURE — 81025 URINE PREGNANCY TEST: CPT | Performed by: OBSTETRICS & GYNECOLOGY

## 2025-06-19 PROCEDURE — 58300 INSERT INTRAUTERINE DEVICE: CPT | Performed by: OBSTETRICS & GYNECOLOGY

## 2025-06-19 NOTE — PROGRESS NOTES
We reviewed the 2 types of IUD's, progesterone containing IUD's and a copper-containing IUD.  I discussed the menstrual side effects of progesterone IUD's including probability of shorter, lighter periods and possibly amenorrhea.  We also discussed risk of having prolonged, light periods as a side effect but that these usually resolve with time.  We discussed that it is possible to have progesterone related side effects including weight changes, mood changes, skin changes and headaches.  I also discussed the possibility of cramping with menses.  I counseled patient that the Mirena and Kyleena IUDs can be used for 5 years but they may be removed at any time before 5 years if patient desires pregnancy or has side effects.  I counseled patient on the ParaGard/copper IUD.  We discussed that the side effects may include heavier bleeding and increasing cramping.  We discussed that because there is no hormones in the ParaGard IUD that they will have no hormonal side effects.  I counseled patient that the ParaGard IUD is good for up to 10 years but may be removed before that at any time if patient desires pregnancy or has side effects.  I counseled patient on risks of IUD insertion including infection, PID, fertility issues and uterine perforation with possible complications.  I discussed side effects of insertion including spotting/bleeding or cramping for a few hours to a few days.    [facility]  Obstetrics and Gynecology  IUD Insertion Procedure Note    IUD Insertion:     Pregnancy Results: negative from urine test   Birth control method(s) used:    Pt's LMP was Patient's last menstrual period was 07/04/2024 (approximate).    Pt counseled on risks of IUD insertion including infection, PID/infertility, bleeding and uterine perforation.  Discussed side effects of kyleena  IUD   Pt understands and has signed consent.    Pelvic Exam Findings:  Cervix normal. Uterus normal .     Procedure:  Speculum placed in the  vagina.  Betadine wash of vagina and cervix.  Single tooth tenaculum was placed at the 12 o'clock position.  Uterus sounded to 6 cm.  kyleena IUD was placed without difficulty.  Strings cut at 3 cm.  Single tooth tenaculum removed.  Patient tolerated procedure well.    Visit Plan:  IUD surveillance was discussed with the patient.  Pt to follow up in 6 weeks for IUD check.    Wesley Jenkins MD  12:24 PM  6/19/2025

## 2025-07-03 ENCOUNTER — TELEPHONE (OUTPATIENT)
Dept: OBGYN CLINIC | Facility: CLINIC | Age: 20
End: 2025-07-03

## (undated) DEVICE — POWDER HEMSTAT 3GM OXIDIZED REGENERATED CELOS

## (undated) NOTE — LETTER
AUTHORIZATION FOR SURGICAL OPERATION OR OTHER PROCEDURE    1. I hereby authorize Dr. Wesley Jenkins, and [facility] staff assigned to my case to perform the following operation and/or procedure at the [facility] site:    IUD Insertion    2.  My physician has explained the nature and purpose of the operation or other procedure, possible alternative methods of treatment, the risks involved, and the possibility of complication to me.  I acknowledge that no guarantee has been made as to the result that may be obtained.  3.  I recognize that, during the course of this operation, or other procedure, unforseen conditions may necessitate additional or different procedure than those listed above.  I, therefore, further authorize and request that the above named physician, his/her physician assistants or designees perform such procedures as are, in his/her professional opinion, necessary and desirable.  4.  Any tissue or organs removed in the operation or other procedure may be disposed of by and at the discretion of the [facility] and [facility].  5.  I understand that in the event of a medical emergency, I will be transported by local paramedics to [facility] or other hospital emergency department.  6.  I certify that I have read and fully understand the above consent to operation and/or other procedure.    7.  I acknowledge that my physician has explained sedation/analgesia administration to me including the risks and benefits.  I consent to the administration of sedation/analgesia as may be necessary or desirable in the judgement of my physician.    Witness signature: ___________________________________________________ Date:  06/19/2025                  Time:  ________ A.M.  P.M.       Patient Name:  ______________________________________________________  (please print)      Patient signature:   ___________________________________________________             Relationship to Patient:           []  Parent    Responsible person                          []  Spouse  In case of minor or                    [] Other  _____________   Incompetent name:  __________________________________________________                               (please print)      _____________      Responsible person  In case of minor or  Incompetent signature:  _______________________________________________    Statement of Physician  My signature below affirms that prior to the time of the procedure, I have explained to the patient and/or his/her guardian, the risks and benefits involved in the proposed treatment and any reasonable alternative to the proposed treatment.  I have also explained the risks and benefits involved in the refusal of the proposed treatment and have answered the patient's questions.                        Date: 06/19/2025  Provider                      Signature:  __________________________________________________________       Time:  ___________ A.M    P.M.

## (undated) NOTE — LETTER
VACCINE ADMINISTRATION RECORD  PARENT / GUARDIAN APPROVAL  Date: 2/3/2025  Vaccine administered to: Haritha Cesar     : 2005    MRN: [id number]    A copy of the appropriate Centers for Disease Control and Prevention Vaccine Information statement has been provided. I have read or have had explained the information about the diseases and the vaccines listed below. There was an opportunity to ask questions and any questions were answered satisfactorily. I believe that I understand the benefits and risks of the vaccine cited and ask that the vaccine(s) listed below be given to me or to the person named above (for whom I am authorized to make this request).    VACCINES ADMINISTERED:  Tdap    I have read and hereby agree to be bound by the terms of this agreement as stated above. My signature is valid until revoked by me in writing.  This document is signed by patient , relationship: Self on 2/3/2025.:                                                                                                                                         Parent / Guardian Signature                                                Date    HALIE Stewart served as a witness to authentication that the identity of the person signing electronically is in fact the person represented as signing.    This document was generated by HALIE Stewart on 2/3/2025.

## (undated) NOTE — LETTER
[facility]  Administration of Anesthesia  Haritha PINTO agree to be cared for by a physician anesthesiologist alone and/or with a nurse anesthetist, who is specially trained to monitor me and give me medicine to put me to sleep or keep me comfortable during my procedure    I understand that my anesthesiologist and/or anesthetist is not an employee or agent of [facility] or [employer]. He or she works for [facility]    As the patient asking for anesthesia services, I agree to:  Allow the anesthesiologist (anesthesia doctor) to give me medicine and do additional procedures as necessary. Some examples are: Starting or using an “IV” to give me medicine, fluids or blood during my procedure, and having a breathing tube placed to help me breathe when I’m asleep (intubation). In the event that my heart stops working properly, I understand that my anesthesiologist will make every effort to sustain my life, unless otherwise directed by [facility] Do Not Resuscitate documents.  Tell my anesthesia doctor before my procedure:  If I am pregnant.  The last time that I ate or drank.  iii. All of the medicines I take (including prescriptions, herbal supplements, and pills I can buy without a prescription (including street drugs/illegal medications). Failure to inform my anesthesiologist about these medicines may increase my risk of anesthetic complications.  iv.If I am allergic to anything or have had a reaction to anesthesia before.  I understand how the anesthesia medicine will help me (benefits).  I understand that with any type of anesthesia medicine there are risks:  The most common risks are: nausea, vomiting, sore throat, muscle soreness, damage to my eyes, mouth, or teeth (from breathing tube placement).  Rare risks include: remembering what happened during my procedure, allergic reactions to medications, injury to my airway, heart, lungs, vision, nerves, or  muscles and in extremely rare instances death.  My doctor has explained to me other choices available to me for my care (alternatives).  Pregnant Patients (“epidural”):  I understand that the risks of having an epidural (medicine given into my back to help control pain during labor), include itching, low blood pressure, difficulty urinating, headache or slowing of the baby’s heart. Very rare risks include infection, bleeding, seizure, irregular heart rhythms and nerve injury.  Regional Anesthesia (“spinal”, “epidural”, & “nerve blocks”):  I understand that rare but potential complications include headache, bleeding, infection, seizure, irregular heart rhythms, and nerve injury.    _____________________________________________________________________________  Patient (or Representative) Signature/Relationship to Patient  Date   Time    _____________________________________________________________________________   Name (if used)    Language/Organization   Time    _____________________________________________________________________________  Nurse Anesthetist Signature     Date   Time  _____________________________________________________________________________  Anesthesiologist Signature     Date   Time  I have discussed the procedure and information above with the patient (or patient’s representative) and answered their questions. The patient or their representative has agreed to have anesthesia services.    _____________________________________________________________________________  Witness        Date   Time  I have verified that the signature is that of the patient or patient’s representative, and that it was signed before the procedure  Patient Name: Haritha Cesar     : 2005                 Printed: 2025 at 3:05 PM    Medical Record #: [id number]                                            Page 1 of 1  ----------ANESTHESIA CONSENT----------

## (undated) NOTE — LETTER
March 10, 2025      Nay Mendoza MD  [address] [facility] [address]  Via In Basket  Patient: Haritha Cesar  : 2005    Dear Colleague:  Thank you for referring your patient to me for a Maternal Fetal Medicine evaluation. Please see my attached note for my findings and recommendations.      Should you have any questions or concerns, please do not hesitate to contact me at the number listed below.    Best Regards,      Tootie Mccord MD  [facility] MATERNAL FETAL MEDICINE  [address] [facility] [address]  [phone]    cc: No Recipients      [facility] Department of Maternal Fetal Medicine  Patient Name: Haritha Cesar  Patient : 2005  Physician: Tootie Mccord MD    Pt for Growth US  Denies pregnancy complaints  States active fetus   Information given to pt for [facility]        Reason for Consult:   Dear Dr. Mendoza,    Thank you for requesting ultrasound evaluation and maternal fetal medicine consultation on Haritha Cesar.  As you are aware she is a 19 year old female with a Orozco pregnancy with Estimated Date of Delivery: 25 .   A maternal-fetal medicine f/u is today. .  Her prenatal records and labs were reviewed.    Planning on copper IUD  Review of History:     OB History:    OB History    Para Term  AB Living   1 0 0 0 0 0   SAB IAB Ectopic Multiple Live Births   0 0 0 0 0      # Outcome Date GA Lbr Duglas/2nd Weight Sex Type Anes PTL Lv   1 Current                      Allergies:  Allergies[1]   Current Meds:  Current Outpatient Medications   Medication Sig Dispense Refill    ondansetron (ZOFRAN) 4 mg tablet Take 1 tablet (4 mg total) by mouth every 8 (eight) hours as needed for Nausea. (Patient not taking: Reported on 2025) 30 tablet 0    cholecalciferol 50 MCG ( UT) Oral Tab Take 1 tablet (2,000 Units total) by mouth daily. (Patient not taking: Reported on 2025)      metoclopramide (REGLAN) 10 MG Oral  Tab Take 1 tablet (10 mg total) by mouth every 6 (six) hours as needed. (Patient not taking: Reported on 2/17/2025) 20 tablet 0    CHLORPROMAZINE 50 MG Oral Tab TAKE 1/2 TABLET(25 MG) BY MOUTH THREE TIMES DAILY (Patient not taking: Reported on 2/17/2025) 30 tablet 0    ascorbic acid 250 MG Oral Tab Take 1 tablet (250 mg total) by mouth every other day. (Patient not taking: Reported on 2/17/2025) 90 tablet 2    calcium carbonate (TUMS) 500 MG Oral Chew Tab Chew 1 tablet (500 mg total) by mouth daily. (Patient not taking: Reported on 2/17/2025) 90 tablet 0    famotidine 20 MG Oral Tab Take 1 tablet (20 mg total) by mouth 2 (two) times daily. (Patient not taking: Reported on 2/17/2025) 60 tablet 5    diphenhydrAMINE HCl 50 MG Oral Tab Take 1 tablet (50 mg total) by mouth every 4 to 6 hours. (Patient not taking: Reported on 2/17/2025) 90 tablet 1    Aspirin 81 MG Oral Cap Take 2 capsules by mouth daily. (Patient not taking: Reported on 2/17/2025) 180 capsule 2    Ferrous Sulfate 325 (65 Fe) MG Oral Tab Take 1 tablet (325 mg total) by mouth daily with breakfast. 90 tablet 2    Prenatal MV-Min-Fe Fum-FA-DHA (PRENATAL/FOLIC ACID+DHA) 27-0.8-200 MG Oral Cap Take 1 capsule by mouth daily. 90 capsule 2    ondansetron 4 MG Oral Tablet Dispersible Take 1 tablet (4 mg total) by mouth every 4 (four) hours as needed for Nausea. (Patient not taking: Reported on 2/17/2025) 15 tablet 0        HISTORY:  Past Medical History:    Anxiety    Decorative tattoo      Past Surgical History:   Procedure Laterality Date    Tonsillectomy        Family History   Problem Relation Age of Onset    Diabetes Mother     Diabetes Maternal Grandmother       Social History     Socioeconomic History    Marital status: Single   Tobacco Use    Smoking status: Never     Passive exposure: Never    Smokeless tobacco: Never   Vaping Use    Vaping status: Never Used   Substance and Sexual Activity    Alcohol use: Never    Drug use: Not Currently     Types:  Cannabis    Sexual activity: Yes     Partners: Male     Social Drivers of Health     Food Insecurity: No Food Insecurity (2024)    Food Insecurity     Food Insecurity: Never true   Transportation Needs: No Transportation Needs (2024)    Transportation Needs     Lack of Transportation: No   Stress: No Stress Concern Present (2024)    Stress     Feeling of Stress : No   Housing Stability: Low Risk  (2024)    Housing Stability     Housing Instability: No        NARRATIVE:   /72   Pulse 111   Wt 198 lb (89.8 kg)   LMP 2024 (Approximate)   BMI 35.07 kg/m²            Alert and Oriented.  No acute distress          Abdomen:  soft, nontender, no contractions noted.           DISCUSSION  During her visit we discussed and reviewed the following issues:    We discussed the morbidity and mortality associated with prematurity at various gestational ages.  The signs and symptoms of  labor and preeclampsia were discussed.        Pregnant adolescents are at particular risk for nutritional deficiencies. Adolescents have increased nutritional needs related to normal pubertal changes (eg, increased height and changes in body composition). At baseline, they may have poor diet quality, with insufficient intake of micronutrients (eg, iron, folate, zinc, calcium).  Adolescents appear to be at increased risk for adverse pregnancy outcomes, such as low-birth-weight babies and infant deaths.  Teenage mothers require instrumental deliveries approximately twice as often as women aged 20 to 24 years.    ----------    OBESITY: Please see previous [facility] detailed discussion.       GLUCOSE 1HR OB   Date Value Ref Range Status   2025 108 See comment mg/dL Final     Comment:     If the plasma glucose level measured after 1 hour is >=130, 135 or 140 mg/dl proceed to \"Glucose Tolerance, 100 gm (0 hr, 1 hr, 2hr, 3hr), Gestational (ADA)\" test on a separate day, as clinically indicated.            Signs and  symptoms of preeclampsia were reviewed.      OB ULTRASOUND REPORT   See imaging tab for complete ultrasound report or in PACS  Ultrasound Findings:  Single IUP in cephalic presentation.    Placenta is posterior.   A 3 vessel cord is noted.  Cardiac activity is present at 151 bpm  EFW 1996 g ( 4 lb 6 oz); 43%.    BUBBA is  15.7 cm.  MVP is 5.2 cm  BPP is 8/8.     The fetal measurements are consistent with established EDC. No gross ultrasound evidence of structural abnormalities are seen today. The patient understands that ultrasound cannot rule out all structural and chromosomal abnormalities.       IMPRESSION:   1. IUP @  32w5d   2. Scan consistent with dates  3. No fetal structural abnormalities seen  4. Teen pregnancy  5.  Obesity BMI 33    RECOMMENDATIONS:     11-20 lb weight gain for pregnancy  Weekly NSTs at 36 weeks    Thank you for allowing me to participate in the care of your patient.  Please do not hesitate to call with any questions or concerns.     Total time spent   20  minutes this calendar day which includes preparing to see the patient including chart review, obtaining and/or reviewing additional medical history, performing a physical exam and evaluation, documenting clinical information in the electronic medical record, independently interpreting results, counseling the patient, communicating results to the patient/family/caregiver and coordinating care.    Tootie Mccord MD   Maternal Fetal Medicine     Note to patient and family:  The 21st Century Cures Act makes medical notes available to patients in the interest of transparency.  However, please be advised that this is a medical document.  It is intended as a peer to peer communication.  It is written in medical language and may contain abbreviations or verbiage that are technical and unfamiliar.  It may appear blunt or direct.  Medical documents are intended to carry relevant information, facts as evident, and the clinical opinion of the  practitioner.         [1] No Known Allergies

## (undated) NOTE — LETTER
10/9/2024        Haritha Allenronnieshellieevelyn        [address] [place] [address]         To Whom It May Concern,    The above mentioned patient is currently under our care for pregnancy. It is permissible at her gestational age for dental work to be performed. However, if x-rays are needed, please make sure that the abdomen and thyroid gland are shielded appropriately, and thoroughly.     Analgesia is also permissible as long as there are no vasoconstrictors used. For post treatment pain relief, Tylenol or Tylenol #3 is acceptable. If an antibiotic is needed, a penicillin derivative (if no allergy) may be used.     If you have any additional concerns, do not hesitate to contact our office at [phone].     Sincerely,    Nay Mendoza MD, MD [address] [facility] [address]  [facility] [address]  Ph: [phone]  Fax: [phone]